# Patient Record
Sex: FEMALE | Race: BLACK OR AFRICAN AMERICAN | NOT HISPANIC OR LATINO | URBAN - METROPOLITAN AREA
[De-identification: names, ages, dates, MRNs, and addresses within clinical notes are randomized per-mention and may not be internally consistent; named-entity substitution may affect disease eponyms.]

---

## 2017-02-01 ENCOUNTER — INPATIENT (INPATIENT)
Facility: HOSPITAL | Age: 22
LOS: 2 days | Discharge: HOME | End: 2017-02-04
Attending: INTERNAL MEDICINE | Admitting: INTERNAL MEDICINE
Payer: COMMERCIAL

## 2017-02-01 VITALS
DIASTOLIC BLOOD PRESSURE: 92 MMHG | WEIGHT: 147.93 LBS | SYSTOLIC BLOOD PRESSURE: 141 MMHG | HEIGHT: 60 IN | TEMPERATURE: 98 F | RESPIRATION RATE: 20 BRPM | OXYGEN SATURATION: 100 % | HEART RATE: 113 BPM

## 2017-02-01 DIAGNOSIS — Z98.89 OTHER SPECIFIED POSTPROCEDURAL STATES: Chronic | ICD-10-CM

## 2017-02-01 LAB
ALBUMIN SERPL ELPH-MCNC: 4.1 G/DL — SIGNIFICANT CHANGE UP (ref 3.3–5)
ALP SERPL-CCNC: 89 U/L — SIGNIFICANT CHANGE UP (ref 40–120)
ALT FLD-CCNC: 115 U/L — HIGH (ref 12–78)
ANION GAP SERPL CALC-SCNC: 16 MMOL/L — SIGNIFICANT CHANGE UP (ref 5–17)
AST SERPL-CCNC: 45 U/L — HIGH (ref 15–37)
BASOPHILS # BLD AUTO: 0.1 K/UL — SIGNIFICANT CHANGE UP (ref 0–0.2)
BASOPHILS NFR BLD AUTO: 0.4 % — SIGNIFICANT CHANGE UP (ref 0–2)
BILIRUB SERPL-MCNC: 0.4 MG/DL — SIGNIFICANT CHANGE UP (ref 0.2–1.2)
BUN SERPL-MCNC: 22 MG/DL — SIGNIFICANT CHANGE UP (ref 7–23)
CALCIUM SERPL-MCNC: 9.3 MG/DL — SIGNIFICANT CHANGE UP (ref 8.5–10.1)
CHLORIDE SERPL-SCNC: 103 MMOL/L — SIGNIFICANT CHANGE UP (ref 96–108)
CO2 SERPL-SCNC: 20 MMOL/L — LOW (ref 22–31)
CREAT SERPL-MCNC: 0.6 MG/DL — SIGNIFICANT CHANGE UP (ref 0.5–1.3)
EOSINOPHIL # BLD AUTO: 0.1 K/UL — SIGNIFICANT CHANGE UP (ref 0–0.5)
EOSINOPHIL NFR BLD AUTO: 0.7 % — SIGNIFICANT CHANGE UP (ref 0–6)
GLUCOSE SERPL-MCNC: 103 MG/DL — HIGH (ref 70–99)
HCG SERPL-ACNC: <1 MIU/ML — SIGNIFICANT CHANGE UP
HCT VFR BLD CALC: 38.5 % — SIGNIFICANT CHANGE UP (ref 34.5–45)
HGB BLD-MCNC: 13.6 G/DL — SIGNIFICANT CHANGE UP (ref 11.5–15.5)
LACTATE SERPL-SCNC: 1.7 MMOL/L — SIGNIFICANT CHANGE UP (ref 0.7–2)
LIDOCAIN IGE QN: 139 U/L — SIGNIFICANT CHANGE UP (ref 73–393)
LYMPHOCYTES # BLD AUTO: 12.7 % — LOW (ref 13–44)
LYMPHOCYTES # BLD AUTO: 2.1 K/UL — SIGNIFICANT CHANGE UP (ref 1–3.3)
MCHC RBC-ENTMCNC: 29.7 PG — SIGNIFICANT CHANGE UP (ref 27–34)
MCHC RBC-ENTMCNC: 35.4 GM/DL — SIGNIFICANT CHANGE UP (ref 32–36)
MCV RBC AUTO: 83.9 FL — SIGNIFICANT CHANGE UP (ref 80–100)
MONOCYTES # BLD AUTO: 1.5 K/UL — HIGH (ref 0–0.9)
MONOCYTES NFR BLD AUTO: 8.7 % — SIGNIFICANT CHANGE UP (ref 2–14)
NEUTROPHILS # BLD AUTO: 13.2 K/UL — HIGH (ref 1.8–7.4)
NEUTROPHILS NFR BLD AUTO: 77.6 % — HIGH (ref 43–77)
PLATELET # BLD AUTO: 359 K/UL — SIGNIFICANT CHANGE UP (ref 150–400)
POTASSIUM SERPL-MCNC: 3.8 MMOL/L — SIGNIFICANT CHANGE UP (ref 3.5–5.3)
POTASSIUM SERPL-SCNC: 3.8 MMOL/L — SIGNIFICANT CHANGE UP (ref 3.5–5.3)
PROT SERPL-MCNC: 8.3 GM/DL — SIGNIFICANT CHANGE UP (ref 6–8.3)
RBC # BLD: 4.59 M/UL — SIGNIFICANT CHANGE UP (ref 3.8–5.2)
RBC # FLD: 11.2 % — SIGNIFICANT CHANGE UP (ref 11–15)
SODIUM SERPL-SCNC: 139 MMOL/L — SIGNIFICANT CHANGE UP (ref 135–145)
WBC # BLD: 17 K/UL — HIGH (ref 3.8–10.5)
WBC # FLD AUTO: 17 K/UL — HIGH (ref 3.8–10.5)

## 2017-02-01 PROCEDURE — 99053 MED SERV 10PM-8AM 24 HR FAC: CPT

## 2017-02-01 PROCEDURE — 99285 EMERGENCY DEPT VISIT HI MDM: CPT | Mod: 25

## 2017-02-01 RX ORDER — IOHEXOL 300 MG/ML
30 INJECTION, SOLUTION INTRAVENOUS ONCE
Qty: 0 | Refills: 0 | Status: COMPLETED | OUTPATIENT
Start: 2017-02-01 | End: 2017-02-01

## 2017-02-01 RX ORDER — ONDANSETRON 8 MG/1
4 TABLET, FILM COATED ORAL ONCE
Qty: 0 | Refills: 0 | Status: COMPLETED | OUTPATIENT
Start: 2017-02-01 | End: 2017-02-01

## 2017-02-01 RX ORDER — MORPHINE SULFATE 50 MG/1
6 CAPSULE, EXTENDED RELEASE ORAL ONCE
Qty: 0 | Refills: 0 | Status: DISCONTINUED | OUTPATIENT
Start: 2017-02-01 | End: 2017-02-01

## 2017-02-01 RX ORDER — SODIUM CHLORIDE 9 MG/ML
1000 INJECTION INTRAMUSCULAR; INTRAVENOUS; SUBCUTANEOUS ONCE
Qty: 0 | Refills: 0 | Status: COMPLETED | OUTPATIENT
Start: 2017-02-01 | End: 2017-02-01

## 2017-02-01 RX ADMIN — SODIUM CHLORIDE 1000 MILLILITER(S): 9 INJECTION INTRAMUSCULAR; INTRAVENOUS; SUBCUTANEOUS at 22:56

## 2017-02-01 RX ADMIN — MORPHINE SULFATE 6 MILLIGRAM(S): 50 CAPSULE, EXTENDED RELEASE ORAL at 23:02

## 2017-02-01 RX ADMIN — ONDANSETRON 4 MILLIGRAM(S): 8 TABLET, FILM COATED ORAL at 22:49

## 2017-02-01 RX ADMIN — MORPHINE SULFATE 6 MILLIGRAM(S): 50 CAPSULE, EXTENDED RELEASE ORAL at 22:49

## 2017-02-01 RX ADMIN — IOHEXOL 30 MILLILITER(S): 300 INJECTION, SOLUTION INTRAVENOUS at 23:02

## 2017-02-01 NOTE — ED PROVIDER NOTE - OBJECTIVE STATEMENT
Pertinent PMH/PSH/FHx/SHx and Review of Systems contained within:  Patient with history of C section about 4 months ago presents to the ED for severe, diffuse abdominal pain Pertinent PMH/PSH/FHx/SHx and Review of Systems contained within:  Patient with history of C section about 4 months ago presents to the ED for severe, diffuse abdominal pain.  Started about 3 hours ago when patient was taking out the trash.  Reports nausea, 1 episode of nonbilious, nonbloody vomit, no diarrhea, no melena, no vaginal discharge or bleeding.    No fever/chills, No photophobia/eye pain/changes in vision, No ear pain/sore throat/dysphagia, No chest pain/palpitations, no SOB/cough/wheeze/stridor, no dysuria/frequency/discharge, No neck/back pain, no rash, no changes in neurological status/function. Pertinent PMH/PSH/FHx/SHx and Review of Systems contained within:  Patient with history of C section about 4 months ago presents to the ED for severe, diffuse abdominal pain.  Started about 3 hours ago when patient was taking out the trash.  Reports nausea, 1 episode of nonbilious, nonbloody vomit, no diarrhea, no melena, no vaginal discharge or bleeding.  Ate frozen pizza earlier tonight.      No fever/chills, No photophobia/eye pain/changes in vision, No ear pain/sore throat/dysphagia, No chest pain/palpitations, no SOB/cough/wheeze/stridor, no dysuria/frequency/discharge, No neck/back pain, no rash, no changes in neurological status/function.

## 2017-02-01 NOTE — ED PROVIDER NOTE - MEDICAL DECISION MAKING DETAILS
Patient with abdominal pain and intractable vomiting.  Labs, CT reviewed.  Patient given multiple medications for symptom control during her overnight stay in the ER.  Continues to feel ill, not tolerating PO.  GI paged for morning consult.  Patient is to be admitted to the hospital and the case was discussed with the admitting physician.  Any changes in plan, additional imaging/labs, and further work up will be at the discretion of the admitting physician.  Patient remained stable in my care.

## 2017-02-02 DIAGNOSIS — D72.829 ELEVATED WHITE BLOOD CELL COUNT, UNSPECIFIED: ICD-10-CM

## 2017-02-02 DIAGNOSIS — R10.9 UNSPECIFIED ABDOMINAL PAIN: ICD-10-CM

## 2017-02-02 DIAGNOSIS — K29.00 ACUTE GASTRITIS WITHOUT BLEEDING: ICD-10-CM

## 2017-02-02 DIAGNOSIS — K52.9 NONINFECTIVE GASTROENTERITIS AND COLITIS, UNSPECIFIED: ICD-10-CM

## 2017-02-02 DIAGNOSIS — K75.9 INFLAMMATORY LIVER DISEASE, UNSPECIFIED: ICD-10-CM

## 2017-02-02 PROCEDURE — 76830 TRANSVAGINAL US NON-OB: CPT | Mod: 26

## 2017-02-02 PROCEDURE — 74020: CPT | Mod: 26

## 2017-02-02 PROCEDURE — 99223 1ST HOSP IP/OBS HIGH 75: CPT

## 2017-02-02 PROCEDURE — 74177 CT ABD & PELVIS W/CONTRAST: CPT | Mod: 26

## 2017-02-02 RX ORDER — HYDROMORPHONE HYDROCHLORIDE 2 MG/ML
1 INJECTION INTRAMUSCULAR; INTRAVENOUS; SUBCUTANEOUS ONCE
Qty: 0 | Refills: 0 | Status: DISCONTINUED | OUTPATIENT
Start: 2017-02-02 | End: 2017-02-02

## 2017-02-02 RX ORDER — MORPHINE SULFATE 50 MG/1
4 CAPSULE, EXTENDED RELEASE ORAL ONCE
Qty: 0 | Refills: 0 | Status: DISCONTINUED | OUTPATIENT
Start: 2017-02-02 | End: 2017-02-02

## 2017-02-02 RX ORDER — ONDANSETRON 8 MG/1
4 TABLET, FILM COATED ORAL EVERY 8 HOURS
Qty: 0 | Refills: 0 | Status: COMPLETED | OUTPATIENT
Start: 2017-02-02 | End: 2017-02-02

## 2017-02-02 RX ORDER — SODIUM CHLORIDE 9 MG/ML
1000 INJECTION, SOLUTION INTRAVENOUS
Qty: 0 | Refills: 0 | Status: DISCONTINUED | OUTPATIENT
Start: 2017-02-03 | End: 2017-02-04

## 2017-02-02 RX ORDER — ONDANSETRON 8 MG/1
4 TABLET, FILM COATED ORAL ONCE
Qty: 0 | Refills: 0 | Status: COMPLETED | OUTPATIENT
Start: 2017-02-02 | End: 2017-02-02

## 2017-02-02 RX ORDER — INFLUENZA VIRUS VACCINE 15; 15; 15; 15 UG/.5ML; UG/.5ML; UG/.5ML; UG/.5ML
0.5 SUSPENSION INTRAMUSCULAR ONCE
Qty: 0 | Refills: 0 | Status: DISCONTINUED | OUTPATIENT
Start: 2017-02-02 | End: 2017-02-04

## 2017-02-02 RX ORDER — PANTOPRAZOLE SODIUM 20 MG/1
40 TABLET, DELAYED RELEASE ORAL DAILY
Qty: 0 | Refills: 0 | Status: DISCONTINUED | OUTPATIENT
Start: 2017-02-02 | End: 2017-02-04

## 2017-02-02 RX ORDER — FAMOTIDINE 10 MG/ML
20 INJECTION INTRAVENOUS ONCE
Qty: 0 | Refills: 0 | Status: COMPLETED | OUTPATIENT
Start: 2017-02-02 | End: 2017-02-02

## 2017-02-02 RX ORDER — MORPHINE SULFATE 50 MG/1
2 CAPSULE, EXTENDED RELEASE ORAL ONCE
Qty: 0 | Refills: 0 | Status: DISCONTINUED | OUTPATIENT
Start: 2017-02-02 | End: 2017-02-02

## 2017-02-02 RX ORDER — SODIUM CHLORIDE 9 MG/ML
1000 INJECTION, SOLUTION INTRAVENOUS
Qty: 0 | Refills: 0 | Status: DISCONTINUED | OUTPATIENT
Start: 2017-02-02 | End: 2017-02-04

## 2017-02-02 RX ADMIN — FAMOTIDINE 20 MILLIGRAM(S): 10 INJECTION INTRAVENOUS at 05:04

## 2017-02-02 RX ADMIN — HYDROMORPHONE HYDROCHLORIDE 1 MILLIGRAM(S): 2 INJECTION INTRAMUSCULAR; INTRAVENOUS; SUBCUTANEOUS at 00:35

## 2017-02-02 RX ADMIN — MORPHINE SULFATE 2 MILLIGRAM(S): 50 CAPSULE, EXTENDED RELEASE ORAL at 19:41

## 2017-02-02 RX ADMIN — MORPHINE SULFATE 4 MILLIGRAM(S): 50 CAPSULE, EXTENDED RELEASE ORAL at 03:58

## 2017-02-02 RX ADMIN — MORPHINE SULFATE 4 MILLIGRAM(S): 50 CAPSULE, EXTENDED RELEASE ORAL at 09:29

## 2017-02-02 RX ADMIN — MORPHINE SULFATE 4 MILLIGRAM(S): 50 CAPSULE, EXTENDED RELEASE ORAL at 04:36

## 2017-02-02 RX ADMIN — MORPHINE SULFATE 2 MILLIGRAM(S): 50 CAPSULE, EXTENDED RELEASE ORAL at 19:16

## 2017-02-02 RX ADMIN — ONDANSETRON 4 MILLIGRAM(S): 8 TABLET, FILM COATED ORAL at 05:04

## 2017-02-02 RX ADMIN — Medication 30 MILLILITER(S): at 13:29

## 2017-02-02 RX ADMIN — Medication 30 MILLILITER(S): at 05:04

## 2017-02-02 RX ADMIN — SODIUM CHLORIDE 150 MILLILITER(S): 9 INJECTION, SOLUTION INTRAVENOUS at 13:03

## 2017-02-02 RX ADMIN — SODIUM CHLORIDE 150 MILLILITER(S): 9 INJECTION, SOLUTION INTRAVENOUS at 21:27

## 2017-02-02 RX ADMIN — HYDROMORPHONE HYDROCHLORIDE 1 MILLIGRAM(S): 2 INJECTION INTRAMUSCULAR; INTRAVENOUS; SUBCUTANEOUS at 02:40

## 2017-02-02 RX ADMIN — PANTOPRAZOLE SODIUM 40 MILLIGRAM(S): 20 TABLET, DELAYED RELEASE ORAL at 13:03

## 2017-02-02 RX ADMIN — ONDANSETRON 4 MILLIGRAM(S): 8 TABLET, FILM COATED ORAL at 13:57

## 2017-02-02 RX ADMIN — Medication 30 MILLILITER(S): at 21:27

## 2017-02-02 NOTE — DISCHARGE NOTE ADULT - CARE PROVIDER_API CALL
Bebeto Goodrich), Gastroenterology; Internal Medicine  [address] [place] [address]  Phone: [phone]  Fax: [phone]

## 2017-02-02 NOTE — DISCHARGE NOTE ADULT - PATIENT PORTAL LINK FT
“You can access the [facility] Patient Portal, offered by [facility], by registering with the following website: [email or web address]”

## 2017-02-02 NOTE — H&P ADULT. - NEUROLOGICAL DETAILS
normal strength/sensation intact/superficial reflexes intact/alert and oriented x 3/responds to pain/responds to verbal commands/deep reflexes intact/cranial nerves intact

## 2017-02-02 NOTE — ED ADULT NURSE REASSESSMENT NOTE - NS ED NURSE REASSESS COMMENT FT1
Patient continues to complain of abdominal pain despite pain medications (morphine and dilaudid) and is vomiting.  Informed Dr. Adame.  Administered maalox, pepcid and zofran IVP.  Will continue to monitor.

## 2017-02-02 NOTE — H&P ADULT. - NEGATIVE NEUROLOGICAL SYMPTOMS
no transient paralysis/no weakness/no confusion/no hemiparesis/no generalized seizures/no focal seizures/no loss of consciousness/no paresthesias/no syncope/no difficulty walking/no tremors/no vertigo/no loss of sensation/no headache/no facial palsy

## 2017-02-02 NOTE — H&P ADULT. - NEGATIVE OPHTHALMOLOGIC SYMPTOMS
no diplopia/no photophobia/no blurred vision L/no lacrimation L/no lacrimation R/no blurred vision R

## 2017-02-02 NOTE — H&P ADULT. - NEGATIVE CARDIOVASCULAR SYMPTOMS
no palpitations/no paroxysmal nocturnal dyspnea/no chest pain/no dyspnea on exertion/no orthopnea/no peripheral edema

## 2017-02-02 NOTE — ED ADULT NURSE REASSESSMENT NOTE - NS ED NURSE REASSESS COMMENT FT1
Patient continues to complain of abdominal pain.  Patient is cringing and moaning.  Patient was administered morphine 6 mg IVP, dilaudid 1 mg IVP, and another morphine 4 mg IVP.  Informed Dr. Adame.

## 2017-02-02 NOTE — H&P ADULT. - PROBLEM SELECTOR PLAN 1
-admit to med-sx  -appreciate GI eval  -cont on peptobismal and PPI  -add zofran  - EGD if no improvement.

## 2017-02-02 NOTE — DISCHARGE NOTE ADULT - CARE PLAN
Principal Discharge DX:	Acute gastritis without hemorrhage, unspecified gastritis type  Instructions for follow-up, activity and diet:	encourage po fluids  Secondary Diagnosis:	Acute abdominal pain  Secondary Diagnosis:	Leukocytosis, unspecified type  Secondary Diagnosis:	Hepatitis Principal Discharge DX:	Gastroenteritis  Goal:	improved; followup with Gastroenterology, Dr Goodrich within 3-5 days of discharge  Instructions for follow-up, activity and diet:	encourage po fluids  Secondary Diagnosis:	Acute abdominal pain  Secondary Diagnosis:	Leukocytosis, unspecified type  Secondary Diagnosis:	Hepatitis  Secondary Diagnosis:	Volume depletion

## 2017-02-02 NOTE — H&P ADULT. - HISTORY OF PRESENT ILLNESS
The patient is a 21 year old female with no significant PMH who presents to ED with nausea, vomiting and abdominal pain since 7 PM Wednesday. States the pain is diffuse, nonradiating, was constant now intermittent, in intensity, stabbing and cramping at times worse with food. No diarrhea, or fever. Symptoms consistent with same symptoms when patient had her gallbladder removed 10/20/15. Her grand mother who is visiting currently has similar symptoms but including diarrhea. Patient states she did not have an EGD at the present time. She vomited initially undigested food and then gagging after. NShe received pain med in ED. Now feeling better. Claimed she ate frozen pizza and chicken w/ rice yesterday.

## 2017-02-02 NOTE — H&P ADULT. - ASSESSMENT
The patient is a 21 year old female with no significant PMH who presents to ED with nausea, vomiting and abdominal pain.   CT abd-   Nonspecific fluid distention of the small and large bowel   which may be associated with gastroenteritis in the correct clinical   setting.  No bowel obstruction or evident bowel wall thickening.

## 2017-02-02 NOTE — DISCHARGE NOTE ADULT - PLAN OF CARE
encourage po fluids improved; followup with Gastroenterology, Dr Goodrich within 3-5 days of discharge

## 2017-02-02 NOTE — CONSULT NOTE ADULT - SUBJECTIVE AND OBJECTIVE BOX
HPI: The patient is a 21 year old female whoe presents with n/v and abdominal pain since 7 PM Wednesday. Continues. Crampy. No diarrhea, or fever. Symptoms consistent with same symptoms when patient had her gallbladder removed 10/20/15. Patient states she did not have an EGD at the present time ) No felling better. Ate frozen pizza and chicken w/ rice yesterday. No other friends/family with same symptoms.      PAST MEDICAL & SURGICAL HISTORY:  Menorrhagia with irregular cycle  H/O bilateral breast reduction surgery (2015)  C/S 3 months ago  Cholecystectomy 10/2/15      MEDICATIONS  (STANDING): - None from home.  influenza   Vaccine 0.5milliLiter(s) IntraMuscular once    MEDICATIONS  (PRN):      Allergies    apple (Unknown)  beer (Unknown)  cat fish (Unknown)  cherry (Unknown)  kiwi (Unknown)  lima beans (Unknown)  No Known Drug Allergies  raspberries (Unknown)  rice (Unknown)    Intolerances        FAMILY HISTORY:  No pertinent family history in first degree relatives      REVIEW OF SYSTEMS:    CONSTITUTIONAL: No fever, weight loss, or fatigue  EYES: No eye pain, visual disturbances, or discharge  ENMT:  No difficulty hearing, tinnitus, vertigo; No sinus or throat pain  NECK: No pain or stiffness  BREASTS: No pain, masses, or nipple discharge  RESPIRATORY: No cough, wheezing, chills or hemoptysis; No shortness of breath  CARDIOVASCULAR: No chest pain, palpitations, dizziness, or leg swelling  GASTROINTESTINAL: No abdominal or epigastric pain. No nausea, vomiting, or hematemesis; No diarrhea or constipation. No melena or hematochezia.  GENITOURINARY: No dysuria, frequency, hematuria, or incontinence  NEUROLOGICAL: No headaches, memory loss, loss of strength, numbness, or tremors  SKIN: No itching, burning, rashes, or lesions   LYMPH NODES: No enlarged glands  ENDOCRINE: No heat or cold intolerance; No hair loss          SOCIAL HISTORY:  CIG - Occasionally  ETOH - occasionally    FAMILY HISTORY:  No pertinent family history in first degree relatives      Vital Signs Last 24 Hrs  T(C): 36.6, Max: 37.2 (02-02 @ 02:40)  T(F): 97.9, Max: 99 (02-02 @ 02:40)  HR: 94 (94 - 116)  BP: 105/55 (100/76 - 141/92)  BP(mean): --  RR: 12 (12 - 20)  SpO2: 99% (99% - 100%)    PHYSICAL EXAM:    GENERAL: NAD, well-groomed, well-developed  HEAD:  Atraumatic, Normocephalic  EYES: EOMI, PERRLA, conjunctiva and sclera clear  ENMT: No tonsillar erythema, exudates, or enlargement; Moist mucous membranes, Good dentition, No lesions  NECK: Supple, No JVD, Normal thyroid  NERVOUS SYSTEM:  Alert & Oriented X3, Good concentration; Motor Strength 5/5 B/L upper and lower extremities; DTRs 2+ intact and symmetric  CHEST/LUNG: Clear to percussion bilaterally; No rales, rhonchi, wheezing, or rubs  HEART: Regular rate and rhythm; No murmurs, rubs, or gallops  ABDOMEN: Soft, Nontender, Nondistended; Bowel sounds present  EXTREMITIES:  2+ Peripheral Pulses, No clubbing, cyanosis, or edema  LYMPH: No lymphadenopathy noted   RECTAL: No masses, prostate normal size and smooth, Guaiaci negative   BREAST: No palpable masses, skin no lesions no retractions, no discharges. adnexal no palpable masses noted   GYN: uterus normal size, adnexal, no palpable masses, no CMT, no uterine discharge   SKIN: No rashes or lesions    LABS:                        13.6   17.0  )-----------( 359      ( 01 Feb 2017 23:16 )             38.5     01 Feb 2017 23:16    139    |  103    |  22     ----------------------------<  103    3.8     |  20     |  0.60     Ca    9.3        01 Feb 2017 23:16    TPro  8.3    /  Alb  4.1    /  TBili  0.4    /  DBili  x      /  AST  45     /  ALT  115    /  AlkPhos  89     01 Feb 2017 23:16            RADIOLOGY & ADDITIONAL STUDIES:

## 2017-02-02 NOTE — DISCHARGE NOTE ADULT - HOSPITAL COURSE
The patient is a 21 year old female with no significant PMH who presents to ED with nausea, vomiting and abdominal pain since 7 PM Wednesday. Her grand mother who is visiting currently has similar symptoms but including diarrhea. 21F, no significant PMH, hx cholecystectomy, adm w/n/v, abd pain, slightly elev LFTs, neg Xray abd, neg transvaginal u/s, CTAP neg, neg hepatitis panel; s/p EGD w/neg findings; tx'd for gastroenteritis and volume depletion supportively w/IVFs, bowel rest, antiemetics, PPI, prn analgesia; diet advanced and pt tolerated without difficulty.  Abnormal LFTs will be followed up by GI as outpatient.

## 2017-02-02 NOTE — H&P ADULT. - RS GEN PE MLT RESP DETAILS PC
respirations non-labored/breath sounds equal/clear to auscultation bilaterally/airway patent/good air movement

## 2017-02-03 DIAGNOSIS — R11.2 NAUSEA WITH VOMITING, UNSPECIFIED: ICD-10-CM

## 2017-02-03 DIAGNOSIS — E87.6 HYPOKALEMIA: ICD-10-CM

## 2017-02-03 DIAGNOSIS — E86.9 VOLUME DEPLETION, UNSPECIFIED: ICD-10-CM

## 2017-02-03 LAB
ALBUMIN SERPL ELPH-MCNC: 3 G/DL — LOW (ref 3.3–5)
ALP SERPL-CCNC: 149 U/L — HIGH (ref 40–120)
ALT FLD-CCNC: 364 U/L — HIGH (ref 12–78)
ANION GAP SERPL CALC-SCNC: 11 MMOL/L — SIGNIFICANT CHANGE UP (ref 5–17)
AST SERPL-CCNC: 174 U/L — HIGH (ref 15–37)
BILIRUB DIRECT SERPL-MCNC: 0.13 MG/DL — SIGNIFICANT CHANGE UP (ref 0.05–0.2)
BILIRUB INDIRECT FLD-MCNC: 0.3 MG/DL — SIGNIFICANT CHANGE UP (ref 0.2–1)
BILIRUB SERPL-MCNC: 0.4 MG/DL — SIGNIFICANT CHANGE UP (ref 0.2–1.2)
BUN SERPL-MCNC: 9 MG/DL — SIGNIFICANT CHANGE UP (ref 7–23)
CALCIUM SERPL-MCNC: 8.3 MG/DL — LOW (ref 8.5–10.1)
CHLORIDE SERPL-SCNC: 104 MMOL/L — SIGNIFICANT CHANGE UP (ref 96–108)
CO2 SERPL-SCNC: 26 MMOL/L — SIGNIFICANT CHANGE UP (ref 22–31)
CREAT SERPL-MCNC: 0.57 MG/DL — SIGNIFICANT CHANGE UP (ref 0.5–1.3)
CRP SERPL-MCNC: 4.66 MG/DL — HIGH (ref 0–0.4)
GLUCOSE SERPL-MCNC: 87 MG/DL — SIGNIFICANT CHANGE UP (ref 70–99)
HAV IGM SER-ACNC: SIGNIFICANT CHANGE UP
HBV CORE IGM SER-ACNC: SIGNIFICANT CHANGE UP
HBV SURFACE AG SER-ACNC: SIGNIFICANT CHANGE UP
HCT VFR BLD CALC: 32.9 % — LOW (ref 34.5–45)
HCV AB S/CO SERPL IA: 0.2 S/CO — SIGNIFICANT CHANGE UP
HCV AB SERPL-IMP: SIGNIFICANT CHANGE UP
HGB BLD-MCNC: 11.2 G/DL — LOW (ref 11.5–15.5)
MCHC RBC-ENTMCNC: 29.5 PG — SIGNIFICANT CHANGE UP (ref 27–34)
MCHC RBC-ENTMCNC: 34.1 GM/DL — SIGNIFICANT CHANGE UP (ref 32–36)
MCV RBC AUTO: 86.5 FL — SIGNIFICANT CHANGE UP (ref 80–100)
PLATELET # BLD AUTO: 253 K/UL — SIGNIFICANT CHANGE UP (ref 150–400)
POTASSIUM SERPL-MCNC: 3.3 MMOL/L — LOW (ref 3.5–5.3)
POTASSIUM SERPL-SCNC: 3.3 MMOL/L — LOW (ref 3.5–5.3)
PROT SERPL-MCNC: 6.7 GM/DL — SIGNIFICANT CHANGE UP (ref 6–8.3)
RBC # BLD: 3.8 M/UL — SIGNIFICANT CHANGE UP (ref 3.8–5.2)
RBC # FLD: 11.8 % — SIGNIFICANT CHANGE UP (ref 11–15)
SODIUM SERPL-SCNC: 141 MMOL/L — SIGNIFICANT CHANGE UP (ref 135–145)
WBC # BLD: 4.7 K/UL — SIGNIFICANT CHANGE UP (ref 3.8–10.5)
WBC # FLD AUTO: 4.7 K/UL — SIGNIFICANT CHANGE UP (ref 3.8–10.5)

## 2017-02-03 PROCEDURE — 88305 TISSUE EXAM BY PATHOLOGIST: CPT | Mod: 26

## 2017-02-03 PROCEDURE — 88312 SPECIAL STAINS GROUP 1: CPT | Mod: 26

## 2017-02-03 PROCEDURE — 99233 SBSQ HOSP IP/OBS HIGH 50: CPT

## 2017-02-03 RX ORDER — ONDANSETRON 8 MG/1
4 TABLET, FILM COATED ORAL ONCE
Qty: 0 | Refills: 0 | Status: DISCONTINUED | OUTPATIENT
Start: 2017-02-03 | End: 2017-02-03

## 2017-02-03 RX ORDER — SODIUM CHLORIDE 9 MG/ML
1000 INJECTION INTRAMUSCULAR; INTRAVENOUS; SUBCUTANEOUS
Qty: 0 | Refills: 0 | Status: DISCONTINUED | OUTPATIENT
Start: 2017-02-03 | End: 2017-02-03

## 2017-02-03 RX ORDER — POTASSIUM CHLORIDE 20 MEQ
40 PACKET (EA) ORAL EVERY 4 HOURS
Qty: 0 | Refills: 0 | Status: COMPLETED | OUTPATIENT
Start: 2017-02-03 | End: 2017-02-03

## 2017-02-03 RX ADMIN — SODIUM CHLORIDE 63 MILLILITER(S): 9 INJECTION, SOLUTION INTRAVENOUS at 01:01

## 2017-02-03 RX ADMIN — SODIUM CHLORIDE 75 MILLILITER(S): 9 INJECTION INTRAMUSCULAR; INTRAVENOUS; SUBCUTANEOUS at 08:22

## 2017-02-03 RX ADMIN — Medication 40 MILLIEQUIVALENT(S): at 14:23

## 2017-02-03 RX ADMIN — Medication 40 MILLIEQUIVALENT(S): at 17:16

## 2017-02-03 RX ADMIN — PANTOPRAZOLE SODIUM 40 MILLIGRAM(S): 20 TABLET, DELAYED RELEASE ORAL at 11:24

## 2017-02-03 NOTE — PROGRESS NOTE ADULT - SUBJECTIVE AND OBJECTIVE BOX
Upper esophagogastroduodenoscopy/ENDOSCOPY    -Informed consent obtained from patient prior to exam.     Indication:  N/V, Abdominal pain    Anesthesia:   provided by:  Dr Beckham    Esophogus:  WNL    Stomach:  Small Hiatal Hernia,  Approximately 150 ml bilious liquids, mild antral gastritis s/p biopsy    Duodenum:  WNL    The patient tolerated the procedure well.    Findings:  As above    Plan:  See below

## 2017-02-03 NOTE — BRIEF OPERATIVE NOTE - POST-OP DX
Gastritis without bleeding, unspecified chronicity, unspecified gastritis type  02/03/2017    Active  Bebeto Goodrich  Hiatal hernia  02/03/2017    Active  Bebeto Goodrich

## 2017-02-03 NOTE — BRIEF OPERATIVE NOTE - PRE-OP DX
Generalized abdominal pain  02/03/2017    Active  Bebeto Goodrich  Non-intractable vomiting with nausea, unspecified vomiting type  02/03/2017    Active  Bebeto Goodrich

## 2017-02-03 NOTE — PROGRESS NOTE ADULT - ASSESSMENT
Nausea/vomiting, abdominal pain - significantly better today. Probably gastroenteritis, EGD essentially negative

## 2017-02-04 VITALS
OXYGEN SATURATION: 98 % | DIASTOLIC BLOOD PRESSURE: 56 MMHG | HEART RATE: 69 BPM | RESPIRATION RATE: 16 BRPM | TEMPERATURE: 99 F | SYSTOLIC BLOOD PRESSURE: 127 MMHG

## 2017-02-04 LAB
ALBUMIN SERPL ELPH-MCNC: 3 G/DL — LOW (ref 3.3–5)
ALP SERPL-CCNC: 120 U/L — SIGNIFICANT CHANGE UP (ref 40–120)
ALT FLD-CCNC: 234 U/L — HIGH (ref 12–78)
ANION GAP SERPL CALC-SCNC: 12 MMOL/L — SIGNIFICANT CHANGE UP (ref 5–17)
ANION GAP SERPL CALC-SCNC: 8 MMOL/L — SIGNIFICANT CHANGE UP (ref 5–17)
AST SERPL-CCNC: 62 U/L — HIGH (ref 15–37)
BILIRUB SERPL-MCNC: 0.3 MG/DL — SIGNIFICANT CHANGE UP (ref 0.2–1.2)
BUN SERPL-MCNC: 6 MG/DL — LOW (ref 7–23)
BUN SERPL-MCNC: 7 MG/DL — SIGNIFICANT CHANGE UP (ref 7–23)
CALCIUM SERPL-MCNC: 7.6 MG/DL — LOW (ref 8.5–10.1)
CALCIUM SERPL-MCNC: 8.4 MG/DL — LOW (ref 8.5–10.1)
CHLORIDE SERPL-SCNC: 104 MMOL/L — SIGNIFICANT CHANGE UP (ref 96–108)
CHLORIDE SERPL-SCNC: 108 MMOL/L — SIGNIFICANT CHANGE UP (ref 96–108)
CO2 SERPL-SCNC: 23 MMOL/L — SIGNIFICANT CHANGE UP (ref 22–31)
CO2 SERPL-SCNC: 24 MMOL/L — SIGNIFICANT CHANGE UP (ref 22–31)
CREAT SERPL-MCNC: 0.53 MG/DL — SIGNIFICANT CHANGE UP (ref 0.5–1.3)
CREAT SERPL-MCNC: 0.61 MG/DL — SIGNIFICANT CHANGE UP (ref 0.5–1.3)
GLUCOSE SERPL-MCNC: 100 MG/DL — HIGH (ref 70–99)
GLUCOSE SERPL-MCNC: 516 MG/DL — CRITICAL HIGH (ref 70–99)
POTASSIUM SERPL-MCNC: 3.9 MMOL/L — SIGNIFICANT CHANGE UP (ref 3.5–5.3)
POTASSIUM SERPL-MCNC: 4 MMOL/L — SIGNIFICANT CHANGE UP (ref 3.5–5.3)
POTASSIUM SERPL-SCNC: 3.9 MMOL/L — SIGNIFICANT CHANGE UP (ref 3.5–5.3)
POTASSIUM SERPL-SCNC: 4 MMOL/L — SIGNIFICANT CHANGE UP (ref 3.5–5.3)
PROT SERPL-MCNC: 6.5 GM/DL — SIGNIFICANT CHANGE UP (ref 6–8.3)
SODIUM SERPL-SCNC: 135 MMOL/L — SIGNIFICANT CHANGE UP (ref 135–145)
SODIUM SERPL-SCNC: 144 MMOL/L — SIGNIFICANT CHANGE UP (ref 135–145)

## 2017-02-04 PROCEDURE — 99239 HOSP IP/OBS DSCHRG MGMT >30: CPT

## 2017-02-04 RX ORDER — MORPHINE SULFATE 50 MG/1
2 CAPSULE, EXTENDED RELEASE ORAL ONCE
Qty: 0 | Refills: 0 | Status: DISCONTINUED | OUTPATIENT
Start: 2017-02-04 | End: 2017-02-04

## 2017-02-04 RX ADMIN — SODIUM CHLORIDE 63 MILLILITER(S): 9 INJECTION, SOLUTION INTRAVENOUS at 06:13

## 2017-02-04 RX ADMIN — PANTOPRAZOLE SODIUM 40 MILLIGRAM(S): 20 TABLET, DELAYED RELEASE ORAL at 12:21

## 2017-02-04 RX ADMIN — MORPHINE SULFATE 2 MILLIGRAM(S): 50 CAPSULE, EXTENDED RELEASE ORAL at 02:17

## 2017-02-04 RX ADMIN — MORPHINE SULFATE 2 MILLIGRAM(S): 50 CAPSULE, EXTENDED RELEASE ORAL at 02:00

## 2017-02-04 NOTE — PROGRESS NOTE ADULT - SUBJECTIVE AND OBJECTIVE BOX
Patient is a 21y old  Female who presents with a chief complaint of nausea, vomiting, abdominal pain (02 Feb 2017 14:42)      HPI:  The patient is a 21 year old female with no significant PMH who presents to ED with nausea, vomiting and abdominal pain since 7 PM Wednesday. States the pain is diffuse, nonradiating, was constant now intermittent, in intensity, stabbing and cramping at times worse with food. No diarrhea, or fever. Symptoms consistent with same symptoms when patient had her gallbladder removed 10/20/15. Her grand mother who is visiting currently has similar symptoms but including diarrhea. Patient states she did not have an EGD at the present time. She vomited initially undigested food and then gagging after. NShe received pain med in ED. Now feeling better. Claimed she ate frozen pizza and chicken w/ rice yesterday. (02 Feb 2017 12:59)      INTERVAL HPI/OVERNIGHT EVENTS:  Patient doing well. Tolerated regular diet.    MEDICATIONS  (STANDING):  influenza   Vaccine 0.5milliLiter(s) IntraMuscular once  dextrose 5% + sodium chloride 0.45%. 1000milliLiter(s) IV Continuous <Continuous>  dextrose 5% + sodium chloride 0.45%. 1000milliLiter(s) IV Continuous <Continuous>  pantoprazole  Injectable 40milliGRAM(s) IV Push daily    MEDICATIONS  (PRN):      FAMILY HISTORY:  No pertinent family history in first degree relatives      Allergies    apple (Unknown)  beer (Unknown)  cat fish (Unknown)  cherry (Unknown)  kiwi (Unknown)  lima beans (Unknown)  No Known Drug Allergies  raspberries (Unknown)  rice (Unknown)    Intolerances        PMH/PSH:  Menorrhagia with irregular cycle  H/O bilateral breast reduction surgery        REVIEW OF SYSTEMS:  CONSTITUTIONAL: No fever, weight loss, or fatigue  RESPIRATORY: No cough, wheezing, chills or hemoptysis; No shortness of breath  CARDIOVASCULAR: No chest pain, palpitations, dizziness, or leg swelling  GASTROINTESTINAL: No abdominal or epigastric pain. No nausea, vomiting, or hematemesis; No diarrhea or constipation. No melena or hematochezia.  GENITOURINARY: No dysuria, frequency, or hematuria      Vital Signs Last 24 Hrs  T(C): 37.1, Max: 37.2 (02-03 @ 17:28)  T(F): 98.8, Max: 99 (02-03 @ 17:28)  HR: 77 (58 - 77)  BP: 93/50 (93/45 - 132/63)  BP(mean): --  RR: 16 (16 - 18)  SpO2: 100% (95% - 100%)    PHYSICAL EXAM:  GENERAL: NAD, well-groomed, well-developed  HEAD:  Atraumatic, Normocephalic  EYES: EOMI, PERRLA, conjunctiva and sclera clear  NECK: Supple, No JVD, Normal thyroid  NERVOUS SYSTEM:  Alert & Oriented X3, Good concentration; Motor Strength 5/5 B/L upper and lower extremities;   CHEST/LUNG: Clear to percussion bilaterally; No rales, rhonchi, wheezing, or rubs  HEART: Regular rate and rhythm; No murmurs, rubs, or gallops  ABDOMEN: Soft, Nontender, Nondistended; Bowel sounds present  EXTREMITIES:  2+ Peripheral Pulses, No clubbing, cyanosis, or edema  LYMPH: No lymphadenopathy noted    LABS:  amylase--[id number] 02-01 @ 23:16    02-03 @ 07:58   HGB 32.9  HCT 11.2  MCV 86.5   RDW 11.8 WBC 4.7 PLTA 253        02-01 @ 23:16   HGB 38.5  HCT 13.6  MCV 83.9   RDW 11.2 WBC 17.0 PLTA 359          04 Feb 2017 08:46    144    |  108    |  7      ----------------------------<  100    3.9     |  24     |  0.53   03 Feb 2017 23:02    135    |  104    |  6      ----------------------------<  516    4.0     |  23     |  0.61   03 Feb 2017 07:58    141    |  104    |  9      ----------------------------<  87     3.3     |  26     |  0.57   01 Feb 2017 23:16    139    |  103    |  22     ----------------------------<  103    3.8     |  20     |  0.60     Ca    8.4        04 Feb 2017 08:46  Ca    7.6        03 Feb 2017 23:02  Ca    8.3        03 Feb 2017 07:58  Ca    9.3        01 Feb 2017 23:16    TPro  6.5    /  Alb  3.0    /  TBili  0.3    /  DBili  x      /  AST  62     /  ALT  234    /  AlkPhos  120    04 Feb 2017 08:46  TPro  6.7    /  Alb  3.0    /  TBili  0.4    /  DBili  .13    /  AST  174    /  ALT  364    /  AlkPhos  149    03 Feb 2017 07:58  TPro  8.3    /  Alb  4.1    /  TBili  0.4    /  DBili  x      /  AST  45     /  ALT  115    /  AlkPhos  89     01 Feb 2017 23:16        CAPILLARY BLOOD GLUCOSE  115 (04 Feb 2017 00:13)    C-Reactive Protein, Serum: 4.66 mg/dL (02-03 @ 09:48)      RADIOLOGY & ADDITIONAL TESTS:    Imaging Personally Reviewed:  [ ] YES  [ ] NO    Consultant(s) Notes Reviewed:  [ ] YES  [ ] NO    Care Discussed with Consultants/Other Providers [ ] YES  [ ] NO

## 2017-02-04 NOTE — PROGRESS NOTE ADULT - ASSESSMENT
Nausea/vomiting, abdominal pain - Patient asymptomatic, tolerating regular diet.  Probably gastroenteritis, EGD essentially negative  Elevated LFTs-better today. CBD nml by CT scan

## 2017-02-04 NOTE — PROVIDER CONTACT NOTE (CRITICAL VALUE NOTIFICATION) - ACTION/TREATMENT ORDERED:
LOLY Bojorquez aware of serum glucose and fingerstick. No orders received. will monitor. repeat blood work ordered for am.

## 2017-02-04 NOTE — PROGRESS NOTE ADULT - PROBLEM SELECTOR PLAN 2
LFTS significantly increased 2317 but improved 2417. r/o viral hepatitis ( EBV, CMV). Patient can be d/c'd and followed as out patient. name / number given to patient.
f/u labs pending

## 2017-02-05 LAB
CMV IGG FLD QL: 0.45 U/ML — SIGNIFICANT CHANGE UP
CMV IGG SERPL-IMP: NEGATIVE — SIGNIFICANT CHANGE UP
CMV IGM FLD-ACNC: <8 AU/ML — SIGNIFICANT CHANGE UP
CMV IGM SERPL QL: NEGATIVE — SIGNIFICANT CHANGE UP

## 2017-02-06 LAB
EBV DNA SERPL NAA+PROBE-ACNC: SIGNIFICANT CHANGE UP
EBVPCR LOG: SIGNIFICANT CHANGE UP LOGIU/ML
SURGICAL PATHOLOGY FINAL REPORT - CH: SIGNIFICANT CHANGE UP

## 2017-02-07 DIAGNOSIS — E87.6 HYPOKALEMIA: ICD-10-CM

## 2017-02-07 DIAGNOSIS — Z91.013 ALLERGY TO SEAFOOD: ICD-10-CM

## 2017-02-07 DIAGNOSIS — Z90.49 ACQUIRED ABSENCE OF OTHER SPECIFIED PARTS OF DIGESTIVE TRACT: ICD-10-CM

## 2017-02-07 DIAGNOSIS — Z91.018 ALLERGY TO OTHER FOODS: ICD-10-CM

## 2017-02-07 DIAGNOSIS — K52.9 NONINFECTIVE GASTROENTERITIS AND COLITIS, UNSPECIFIED: ICD-10-CM

## 2017-02-07 DIAGNOSIS — R94.5 ABNORMAL RESULTS OF LIVER FUNCTION STUDIES: ICD-10-CM

## 2017-02-07 DIAGNOSIS — R11.2 NAUSEA WITH VOMITING, UNSPECIFIED: ICD-10-CM

## 2017-02-07 DIAGNOSIS — K44.9 DIAPHRAGMATIC HERNIA WITHOUT OBSTRUCTION OR GANGRENE: ICD-10-CM

## 2017-02-07 DIAGNOSIS — K75.9 INFLAMMATORY LIVER DISEASE, UNSPECIFIED: ICD-10-CM

## 2017-02-07 DIAGNOSIS — R73.9 HYPERGLYCEMIA, UNSPECIFIED: ICD-10-CM

## 2017-02-07 DIAGNOSIS — E86.9 VOLUME DEPLETION, UNSPECIFIED: ICD-10-CM

## 2017-02-07 DIAGNOSIS — K29.00 ACUTE GASTRITIS WITHOUT BLEEDING: ICD-10-CM

## 2017-02-07 DIAGNOSIS — D72.829 ELEVATED WHITE BLOOD CELL COUNT, UNSPECIFIED: ICD-10-CM

## 2017-09-23 ENCOUNTER — EMERGENCY (EMERGENCY)
Facility: HOSPITAL | Age: 22
LOS: 0 days | Discharge: HOME | End: 2017-09-23
Attending: EMERGENCY MEDICINE
Payer: COMMERCIAL

## 2017-09-23 VITALS
DIASTOLIC BLOOD PRESSURE: 80 MMHG | WEIGHT: 143.96 LBS | HEART RATE: 93 BPM | TEMPERATURE: 98 F | HEIGHT: 60 IN | RESPIRATION RATE: 18 BRPM | SYSTOLIC BLOOD PRESSURE: 125 MMHG | OXYGEN SATURATION: 99 %

## 2017-09-23 VITALS
DIASTOLIC BLOOD PRESSURE: 77 MMHG | SYSTOLIC BLOOD PRESSURE: 128 MMHG | TEMPERATURE: 99 F | RESPIRATION RATE: 17 BRPM | OXYGEN SATURATION: 99 % | HEART RATE: 88 BPM

## 2017-09-23 DIAGNOSIS — Z98.89 OTHER SPECIFIED POSTPROCEDURAL STATES: Chronic | ICD-10-CM

## 2017-09-23 PROCEDURE — 99284 EMERGENCY DEPT VISIT MOD MDM: CPT | Mod: 25

## 2017-09-23 RX ORDER — DEXAMETHASONE 0.5 MG/5ML
10 ELIXIR ORAL ONCE
Qty: 0 | Refills: 0 | Status: COMPLETED | OUTPATIENT
Start: 2017-09-23 | End: 2017-09-23

## 2017-09-23 RX ORDER — ALBUTEROL 90 UG/1
1 AEROSOL, METERED ORAL
Qty: 12 | Refills: 0 | OUTPATIENT
Start: 2017-09-23 | End: 2017-09-25

## 2017-09-23 RX ORDER — IPRATROPIUM/ALBUTEROL SULFATE 18-103MCG
3 AEROSOL WITH ADAPTER (GRAM) INHALATION
Qty: 0 | Refills: 0 | Status: COMPLETED | OUTPATIENT
Start: 2017-09-23 | End: 2017-09-23

## 2017-09-23 RX ORDER — AZITHROMYCIN 500 MG/1
1 TABLET, FILM COATED ORAL
Qty: 4 | Refills: 0 | OUTPATIENT
Start: 2017-09-23 | End: 2017-09-27

## 2017-09-23 RX ORDER — KETOROLAC TROMETHAMINE 30 MG/ML
60 SYRINGE (ML) INJECTION ONCE
Qty: 0 | Refills: 0 | Status: DISCONTINUED | OUTPATIENT
Start: 2017-09-23 | End: 2017-09-23

## 2017-09-23 RX ORDER — AZITHROMYCIN 500 MG/1
500 TABLET, FILM COATED ORAL ONCE
Qty: 0 | Refills: 0 | Status: COMPLETED | OUTPATIENT
Start: 2017-09-23 | End: 2017-09-23

## 2017-09-23 RX ADMIN — Medication 60 MILLIGRAM(S): at 09:38

## 2017-09-23 RX ADMIN — Medication 60 MILLIGRAM(S): at 08:37

## 2017-09-23 RX ADMIN — Medication 10 MILLIGRAM(S): at 08:36

## 2017-09-23 RX ADMIN — Medication 3 MILLILITER(S): at 08:25

## 2017-09-23 RX ADMIN — Medication 3 MILLILITER(S): at 08:30

## 2017-09-23 RX ADMIN — AZITHROMYCIN 500 MILLIGRAM(S): 500 TABLET, FILM COATED ORAL at 08:36

## 2017-09-23 RX ADMIN — Medication 3 MILLILITER(S): at 08:35

## 2017-09-23 NOTE — ED ADULT NURSE NOTE - DISCHARGE DATE/TIME
HISTORY OF PRESENT ILLNESS     HPI Comments: 53-year-old gentleman who presents to the walk-in clinic today complaining of sore throat, pain with swallowing, nasal congestion, postnasal drip.  Symptoms have only been present for last 24 hours.  He did not take anything over-the-counter.  No nausea or vomiting.  No documented fevers.      PAST MEDICAL, FAMILY AND SOCIAL HISTORY     The following histories were personally reviewed and updated.  Current medications, Allergies, Problem list, Past Medical History and Social History    REVIEW OF SYSTEMS     Review of Systems   HENT: Positive for congestion, postnasal drip, rhinorrhea, sinus pressure and sore throat.        PHYSICAL EXAM     Physical Exam   Constitutional: He is oriented to person, place, and time. He appears well-developed and well-nourished. No distress.   HENT:   Head: Normocephalic and atraumatic.   Thick mucus secretions seen behind the tympanic membranes bilaterally.  Palpation of the frontal and maxillary sinuses did not elicit tenderness.  Oropharynx is clear.   Eyes: Conjunctivae and EOM are normal. Pupils are equal, round, and reactive to light.   Neck: Normal range of motion. Neck supple.   Cardiovascular: Normal rate, regular rhythm and normal heart sounds.  Exam reveals no gallop and no friction rub.    No murmur heard.  Pulmonary/Chest: Effort normal and breath sounds normal. No respiratory distress. He has no wheezes. He has no rales.   Neurological: He is alert and oriented to person, place, and time.   Skin: Skin is warm and dry. He is not diaphoretic.   Psychiatric: He has a normal mood and affect. His behavior is normal.   Nursing note and vitals reviewed.      ASSESSMENT/PLAN     53-year-old gentleman with URI and sore throat.    1.  Will treat the patient with a Z-Ramon.  Claritin, Allegra, or Zyrtec may help to reduce postnasal drip and alleviate pressure.  Saltwater gargles and Chloraseptic spray may help with the throat pain.   Patient may take Tylenol alternating with Motrin every 4-6 hours when necessary for any fever or discomfort.    If symptoms worsen or fail to improve, patient should follow-up with his PCP.   23-Sep-2017 09:43

## 2017-09-23 NOTE — ED PROVIDER NOTE - PHYSICAL EXAMINATION
Gen: Alert, NAD Head: NC, AT, PERRL, EOMI, normal lids/conjunctiva ENT:nasal congestion, normal hearing, patent oropharynx without erythema/exudate, uvula midline Neck: +supple, no tenderness/meningismus/JVD, +Trachea midline Pulm: Bilateral BS, normal resp effort, no wheeze/stridor/retractions CV: RRR, no M/R/G, +dist pulses  Abd: soft, NT/ND, +BS, no hepatosplenomegaly Mskel: no edema/erythema/cyanosis Skin: no rash Neuro: AAOx3, no sensory/motor deficits, CN 2-12 intact

## 2017-09-23 NOTE — ED PROVIDER NOTE - OBJECTIVE STATEMENT
Pertinent PMH/PSH/FHx/SHx and Review of Systems contained within:  Patient presents to the ED for cough.  PMh of asthma.  Patient states started as URI and now with new chills and productive cough.  no other complaints.  no other concersn.  Non toxic.  Well appearing. speaking in full sentences.  no splinting.  lying supine.  smilling.  No aggravating or relieving factors. No other pertinent PMH.  No other pertinent PSH.  No other pertinent FHx.  Patient denies EtOH/tobacco/illicit substance use. No fever/chills, No photophobia/eye pain/changes in vision, No ear pain/sore throat/dysphagia, No chest pain/palpitations, no SOB/wheeze/stridor, No abdominal pain, No N/V/D, no dysuria/frequency/discharge, No neck/back pain, no rash, no changes in neurological status/function.

## 2017-09-23 NOTE — ED PROVIDER NOTE - MEDICAL DECISION MAKING DETAILS
Patient presnt with URI and then chills an productive cough in the setting of asthma.  d/w patient re: r/b/a of treatment ooptions.  will start steroids, nebs, abx.  VSS.  Uneventful ED observation period. smiling.  wants to d/c.  will f/u.  Uneventful ED observation period. Patient given prescription medications for their condition and advised to take them as prescribed and check with their Primary Care Provider if any questions arise. Discussed results and outcome of testing with the patient.  Patient advised to please follow up with their primary care doctor within the next 24 hours and return to the Emergency Department for worsening symptoms or any other concerns.  Patient advised that their doctor may call  to follow up on the specific results of the tests performed today in the emergency department.

## 2017-09-24 DIAGNOSIS — Z91.018 ALLERGY TO OTHER FOODS: ICD-10-CM

## 2017-09-24 DIAGNOSIS — J40 BRONCHITIS, NOT SPECIFIED AS ACUTE OR CHRONIC: ICD-10-CM

## 2017-09-24 DIAGNOSIS — R05 COUGH: ICD-10-CM

## 2020-05-04 NOTE — H&P ADULT. - FAMILY HISTORY
PATIENT CALLED AND STATED SHE ESTABLISHED CARE WITH [facility] ON 04/15/2020 VIA VIRTUAL VISIT. DURING THIS VISIT THE ATIENT STATES THAT SHE WAS TOLD HER MEDICATION COULD BE REFILLED SINCE SHE HAD ESTABLISHED CARE. THE PATIENT STATES SHE RECEIVED A LETTER IN THE MAIL FROM [facility] STATING HER MEDICATIONS HAD BEEN DENIED. TH PATIENT NEEDS REFILLS ON THE FOLLOWING MEDICATIONS;    meloxicam (MOBIC) 15 MG tablet    escitalopram (LEXAPRO) 10 MG tablet    THE PATIENT STATES THAT SHE IS RUNNING LOW ON THESE MEDICATIONS AND THAT SHE HAD BEEN TAKING THEM FOR A WHILE BEFORE SHE HAD ESTABLISHED CARE. THE PATIENT WOULD LIKE TO KNOW WHY THEY HAD BEEN DENIED OR IF THERE IS SOMETHING ELSE SHE NEEDS TO DO TO ENSURE SHE GETS HER REFILLS. PLEASE ADVISE.     PHARMACY IS [facility] ([facility])     PATIENT CALL BACK [phone]   No pertinent family history in first degree relatives

## 2020-07-08 NOTE — ED ADULT TRIAGE NOTE - HEIGHT IN INCHES
0 Quality 226: Preventive Care And Screening: Tobacco Use: Screening And Cessation Intervention: Patient screened for tobacco use and is an ex/non-smoker Quality 128: Preventive Care And Screening: Body Mass Index (Bmi) Screening And Follow-Up Plan: BMI is documented within normal parameters and no follow-up plan is required. Quality 131: Pain Assessment And Follow-Up: Pain assessment using a standardized tool is documented as negative, no follow-up plan required Quality 130: Documentation Of Current Medications In The Medical Record: Current Medications Documented Quality 111:Pneumonia Vaccination Status For Older Adults: Pneumococcal Vaccination Previously Received Quality 110: Preventive Care And Screening: Influenza Immunization: Influenza Immunization Administered during Influenza season Detail Level: Detailed

## 2023-04-24 ENCOUNTER — EMERGENCY (EMERGENCY)
Facility: HOSPITAL | Age: 28
LOS: 1 days | Discharge: HOME | End: 2023-04-24
Admitting: STUDENT IN AN ORGANIZED HEALTH CARE EDUCATION/TRAINING PROGRAM
Payer: COMMERCIAL

## 2023-04-24 VITALS
HEART RATE: 102 BPM | TEMPERATURE: 100 F | SYSTOLIC BLOOD PRESSURE: 105 MMHG | RESPIRATION RATE: 17 BRPM | DIASTOLIC BLOOD PRESSURE: 66 MMHG | OXYGEN SATURATION: 100 %

## 2023-04-24 DIAGNOSIS — Z98.89 OTHER SPECIFIED POSTPROCEDURAL STATES: Chronic | ICD-10-CM

## 2023-04-24 LAB
ALBUMIN SERPL ELPH-MCNC: 3.9 G/DL — SIGNIFICANT CHANGE UP (ref 3.3–5)
ALP SERPL-CCNC: 56 U/L — SIGNIFICANT CHANGE UP (ref 40–120)
ALT FLD-CCNC: 18 U/L — SIGNIFICANT CHANGE UP (ref 4–33)
ANION GAP SERPL CALC-SCNC: 13 MMOL/L — SIGNIFICANT CHANGE UP (ref 7–14)
APPEARANCE UR: CLEAR — SIGNIFICANT CHANGE UP
AST SERPL-CCNC: 21 U/L — SIGNIFICANT CHANGE UP (ref 4–32)
BACTERIA # UR AUTO: NEGATIVE — SIGNIFICANT CHANGE UP
BASE EXCESS BLDV CALC-SCNC: -2 MMOL/L — SIGNIFICANT CHANGE UP (ref -2–3)
BASOPHILS # BLD AUTO: 0.01 K/UL — SIGNIFICANT CHANGE UP (ref 0–0.2)
BASOPHILS NFR BLD AUTO: 0.2 % — SIGNIFICANT CHANGE UP (ref 0–2)
BILIRUB SERPL-MCNC: 0.3 MG/DL — SIGNIFICANT CHANGE UP (ref 0.2–1.2)
BILIRUB UR-MCNC: NEGATIVE — SIGNIFICANT CHANGE UP
BLOOD GAS VENOUS COMPREHENSIVE RESULT: SIGNIFICANT CHANGE UP
BUN SERPL-MCNC: 6 MG/DL — LOW (ref 7–23)
CALCIUM SERPL-MCNC: 8.8 MG/DL — SIGNIFICANT CHANGE UP (ref 8.4–10.5)
CHLORIDE BLDV-SCNC: 104 MMOL/L — SIGNIFICANT CHANGE UP (ref 96–108)
CHLORIDE SERPL-SCNC: 104 MMOL/L — SIGNIFICANT CHANGE UP (ref 98–107)
CO2 BLDV-SCNC: 25 MMOL/L — SIGNIFICANT CHANGE UP (ref 22–26)
CO2 SERPL-SCNC: 18 MMOL/L — LOW (ref 22–31)
COLOR SPEC: YELLOW — SIGNIFICANT CHANGE UP
CREAT SERPL-MCNC: 0.56 MG/DL — SIGNIFICANT CHANGE UP (ref 0.5–1.3)
DIFF PNL FLD: NEGATIVE — SIGNIFICANT CHANGE UP
EGFR: 128 ML/MIN/1.73M2 — SIGNIFICANT CHANGE UP
EOSINOPHIL # BLD AUTO: 0.01 K/UL — SIGNIFICANT CHANGE UP (ref 0–0.5)
EOSINOPHIL NFR BLD AUTO: 0.2 % — SIGNIFICANT CHANGE UP (ref 0–6)
EPI CELLS # UR: 4 /HPF — SIGNIFICANT CHANGE UP (ref 0–5)
FLUAV AG NPH QL: SIGNIFICANT CHANGE UP
FLUBV AG NPH QL: SIGNIFICANT CHANGE UP
GAS PNL BLDV: 134 MMOL/L — LOW (ref 136–145)
GLUCOSE BLDV-MCNC: 74 MG/DL — SIGNIFICANT CHANGE UP (ref 70–99)
GLUCOSE SERPL-MCNC: 82 MG/DL — SIGNIFICANT CHANGE UP (ref 70–99)
GLUCOSE UR QL: NEGATIVE — SIGNIFICANT CHANGE UP
HCO3 BLDV-SCNC: 24 MMOL/L — SIGNIFICANT CHANGE UP (ref 22–29)
HCT VFR BLD CALC: 35 % — SIGNIFICANT CHANGE UP (ref 34.5–45)
HCT VFR BLDA CALC: 37 % — SIGNIFICANT CHANGE UP (ref 34.5–46.5)
HGB BLD CALC-MCNC: 12.4 G/DL — SIGNIFICANT CHANGE UP (ref 11.7–16.1)
HGB BLD-MCNC: 11.7 G/DL — SIGNIFICANT CHANGE UP (ref 11.5–15.5)
HYALINE CASTS # UR AUTO: 1 /LPF — SIGNIFICANT CHANGE UP (ref 0–7)
IANC: 4.37 K/UL — SIGNIFICANT CHANGE UP (ref 1.8–7.4)
IMM GRANULOCYTES NFR BLD AUTO: 0.3 % — SIGNIFICANT CHANGE UP (ref 0–0.9)
KETONES UR-MCNC: ABNORMAL
LACTATE BLDV-MCNC: 1.4 MMOL/L — SIGNIFICANT CHANGE UP (ref 0.5–2)
LEUKOCYTE ESTERASE UR-ACNC: NEGATIVE — SIGNIFICANT CHANGE UP
LIDOCAIN IGE QN: 18 U/L — SIGNIFICANT CHANGE UP (ref 7–60)
LYMPHOCYTES # BLD AUTO: 0.91 K/UL — LOW (ref 1–3.3)
LYMPHOCYTES # BLD AUTO: 15.5 % — SIGNIFICANT CHANGE UP (ref 13–44)
MAGNESIUM SERPL-MCNC: 2 MG/DL — SIGNIFICANT CHANGE UP (ref 1.6–2.6)
MCHC RBC-ENTMCNC: 32.4 PG — SIGNIFICANT CHANGE UP (ref 27–34)
MCHC RBC-ENTMCNC: 33.4 GM/DL — SIGNIFICANT CHANGE UP (ref 32–36)
MCV RBC AUTO: 97 FL — SIGNIFICANT CHANGE UP (ref 80–100)
MONOCYTES # BLD AUTO: 0.56 K/UL — SIGNIFICANT CHANGE UP (ref 0–0.9)
MONOCYTES NFR BLD AUTO: 9.5 % — SIGNIFICANT CHANGE UP (ref 2–14)
NEUTROPHILS # BLD AUTO: 4.37 K/UL — SIGNIFICANT CHANGE UP (ref 1.8–7.4)
NEUTROPHILS NFR BLD AUTO: 74.3 % — SIGNIFICANT CHANGE UP (ref 43–77)
NITRITE UR-MCNC: NEGATIVE — SIGNIFICANT CHANGE UP
NRBC # BLD: 0 /100 WBCS — SIGNIFICANT CHANGE UP (ref 0–0)
NRBC # FLD: 0 K/UL — SIGNIFICANT CHANGE UP (ref 0–0)
PCO2 BLDV: 43 MMHG — SIGNIFICANT CHANGE UP (ref 39–52)
PH BLDV: 7.35 — SIGNIFICANT CHANGE UP (ref 7.32–7.43)
PH UR: 6 — SIGNIFICANT CHANGE UP (ref 5–8)
PLATELET # BLD AUTO: 126 K/UL — LOW (ref 150–400)
PO2 BLDV: 25 MMHG — SIGNIFICANT CHANGE UP (ref 25–45)
POTASSIUM BLDV-SCNC: 3.2 MMOL/L — LOW (ref 3.5–5.1)
POTASSIUM SERPL-MCNC: 3.3 MMOL/L — LOW (ref 3.5–5.3)
POTASSIUM SERPL-SCNC: 3.3 MMOL/L — LOW (ref 3.5–5.3)
PROT SERPL-MCNC: 7.2 G/DL — SIGNIFICANT CHANGE UP (ref 6–8.3)
PROT UR-MCNC: ABNORMAL
RBC # BLD: 3.61 M/UL — LOW (ref 3.8–5.2)
RBC # FLD: 12.1 % — SIGNIFICANT CHANGE UP (ref 10.3–14.5)
RBC CASTS # UR COMP ASSIST: 5 /HPF — HIGH (ref 0–4)
RSV RNA NPH QL NAA+NON-PROBE: SIGNIFICANT CHANGE UP
SAO2 % BLDV: 33.1 % — LOW (ref 67–88)
SARS-COV-2 RNA SPEC QL NAA+PROBE: SIGNIFICANT CHANGE UP
SODIUM SERPL-SCNC: 135 MMOL/L — SIGNIFICANT CHANGE UP (ref 135–145)
SP GR SPEC: 1.03 — SIGNIFICANT CHANGE UP (ref 1.01–1.05)
UROBILINOGEN FLD QL: SIGNIFICANT CHANGE UP
WBC # BLD: 5.88 K/UL — SIGNIFICANT CHANGE UP (ref 3.8–10.5)
WBC # FLD AUTO: 5.88 K/UL — SIGNIFICANT CHANGE UP (ref 3.8–10.5)
WBC UR QL: 3 /HPF — SIGNIFICANT CHANGE UP (ref 0–5)

## 2023-04-24 PROCEDURE — 99223 1ST HOSP IP/OBS HIGH 75: CPT

## 2023-04-24 PROCEDURE — 74177 CT ABD & PELVIS W/CONTRAST: CPT | Mod: 26,MA

## 2023-04-24 PROCEDURE — 71046 X-RAY EXAM CHEST 2 VIEWS: CPT | Mod: 26

## 2023-04-24 PROCEDURE — 71250 CT THORAX DX C-: CPT | Mod: 26,MA

## 2023-04-24 RX ORDER — SODIUM CHLORIDE 9 MG/ML
1000 INJECTION INTRAMUSCULAR; INTRAVENOUS; SUBCUTANEOUS ONCE
Refills: 0 | Status: COMPLETED | OUTPATIENT
Start: 2023-04-24 | End: 2023-04-24

## 2023-04-24 RX ORDER — CEFTRIAXONE 500 MG/1
1000 INJECTION, POWDER, FOR SOLUTION INTRAMUSCULAR; INTRAVENOUS ONCE
Refills: 0 | Status: COMPLETED | OUTPATIENT
Start: 2023-04-24 | End: 2023-04-24

## 2023-04-24 RX ORDER — POTASSIUM CHLORIDE 20 MEQ
40 PACKET (EA) ORAL ONCE
Refills: 0 | Status: COMPLETED | OUTPATIENT
Start: 2023-04-24 | End: 2023-04-24

## 2023-04-24 RX ORDER — ACETAMINOPHEN 500 MG
1000 TABLET ORAL ONCE
Refills: 0 | Status: COMPLETED | OUTPATIENT
Start: 2023-04-24 | End: 2023-04-24

## 2023-04-24 RX ORDER — ALBUTEROL 90 UG/1
2.5 AEROSOL, METERED ORAL ONCE
Refills: 0 | Status: COMPLETED | OUTPATIENT
Start: 2023-04-24 | End: 2023-04-24

## 2023-04-24 RX ORDER — DIPHENHYDRAMINE HCL 50 MG
25 CAPSULE ORAL ONCE
Refills: 0 | Status: COMPLETED | OUTPATIENT
Start: 2023-04-24 | End: 2023-04-24

## 2023-04-24 RX ORDER — ONDANSETRON 8 MG/1
4 TABLET, FILM COATED ORAL ONCE
Refills: 0 | Status: COMPLETED | OUTPATIENT
Start: 2023-04-24 | End: 2023-04-24

## 2023-04-24 RX ORDER — AZITHROMYCIN 500 MG/1
500 TABLET, FILM COATED ORAL EVERY 24 HOURS
Refills: 0 | Status: DISCONTINUED | OUTPATIENT
Start: 2023-04-24 | End: 2023-04-27

## 2023-04-24 RX ORDER — METOCLOPRAMIDE HCL 10 MG
10 TABLET ORAL ONCE
Refills: 0 | Status: COMPLETED | OUTPATIENT
Start: 2023-04-24 | End: 2023-04-24

## 2023-04-24 RX ORDER — ONDANSETRON 8 MG/1
4 TABLET, FILM COATED ORAL EVERY 8 HOURS
Refills: 0 | Status: DISCONTINUED | OUTPATIENT
Start: 2023-04-24 | End: 2023-04-27

## 2023-04-24 RX ORDER — LEVOFLOXACIN 5 MG/ML
1 INJECTION, SOLUTION INTRAVENOUS
Qty: 7 | Refills: 0
Start: 2023-04-24 | End: 2023-04-30

## 2023-04-24 RX ORDER — CEFTRIAXONE 500 MG/1
1000 INJECTION, POWDER, FOR SOLUTION INTRAMUSCULAR; INTRAVENOUS EVERY 24 HOURS
Refills: 0 | Status: DISCONTINUED | OUTPATIENT
Start: 2023-04-24 | End: 2023-04-27

## 2023-04-24 RX ORDER — KETOROLAC TROMETHAMINE 30 MG/ML
15 SYRINGE (ML) INJECTION ONCE
Refills: 0 | Status: DISCONTINUED | OUTPATIENT
Start: 2023-04-24 | End: 2023-04-24

## 2023-04-24 RX ORDER — AZITHROMYCIN 500 MG/1
500 TABLET, FILM COATED ORAL ONCE
Refills: 0 | Status: COMPLETED | OUTPATIENT
Start: 2023-04-24 | End: 2023-04-24

## 2023-04-24 RX ORDER — SODIUM CHLORIDE 9 MG/ML
1000 INJECTION, SOLUTION INTRAVENOUS
Refills: 0 | Status: DISCONTINUED | OUTPATIENT
Start: 2023-04-24 | End: 2023-04-27

## 2023-04-24 RX ADMIN — ONDANSETRON 4 MILLIGRAM(S): 8 TABLET, FILM COATED ORAL at 11:13

## 2023-04-24 RX ADMIN — Medication 15 MILLIGRAM(S): at 14:21

## 2023-04-24 RX ADMIN — Medication 25 MILLIGRAM(S): at 14:22

## 2023-04-24 RX ADMIN — SODIUM CHLORIDE 1000 MILLILITER(S): 9 INJECTION INTRAMUSCULAR; INTRAVENOUS; SUBCUTANEOUS at 14:21

## 2023-04-24 RX ADMIN — Medication 400 MILLIGRAM(S): at 11:13

## 2023-04-24 RX ADMIN — CEFTRIAXONE 100 MILLIGRAM(S): 500 INJECTION, POWDER, FOR SOLUTION INTRAMUSCULAR; INTRAVENOUS at 16:01

## 2023-04-24 RX ADMIN — SODIUM CHLORIDE 100 MILLILITER(S): 9 INJECTION, SOLUTION INTRAVENOUS at 23:55

## 2023-04-24 RX ADMIN — Medication 10 MILLIGRAM(S): at 14:21

## 2023-04-24 RX ADMIN — AZITHROMYCIN 255 MILLIGRAM(S): 500 TABLET, FILM COATED ORAL at 16:46

## 2023-04-24 RX ADMIN — ALBUTEROL 2.5 MILLIGRAM(S): 90 AEROSOL, METERED ORAL at 23:56

## 2023-04-24 RX ADMIN — Medication 40 MILLIEQUIVALENT(S): at 23:56

## 2023-04-24 RX ADMIN — Medication 1000 MILLIGRAM(S): at 11:40

## 2023-04-24 RX ADMIN — SODIUM CHLORIDE 1000 MILLILITER(S): 9 INJECTION INTRAMUSCULAR; INTRAVENOUS; SUBCUTANEOUS at 11:13

## 2023-04-24 NOTE — ED PROVIDER NOTE - PROGRESS NOTE DETAILS
LOLY Pool: received sign out from LOLY [place] to follow up labs, CT.  Received call from CT states contrast extravasated at IV site Left AC, IV was removed, pt reports minimal pain to site, compartments are soft, mild swelling at site noted, NV intact, warm compresses.  Pt examined beside with Dr Lloyd. Patient post CT scan with left IV infiltration AC. Patient with soft compartments reports mild tenderness to the area. Neurovascular intact in left upper extremity. Reports continued mild headache. Migraine cocktail ordered, pending repeat CT scan abdomen pelvis. Patient without neck stiffness or neck rigidity pt w/ ct showing ? right lung lesion vs pneumonia, pt reports cough w/ fever likely pneumonia, ct chest w/o contrast ordered, pt to receive ceftriaxone + azithro. PO trial, if tolerates will d/c on levquin 500mg x 7 days for pneumonia. w/ pulm / PCP f/u and repeat imaging in 4-6 weeks LOLY Pool: pt resting comfortably NAD, pt states she feels better, headache resolved.  Pt advised of results and need for CT chest given CT results.  Pt denies any complaints at present time.  LUE: mild swelling noted to forearm, NV intact, compartments are soft. LOLY Pool: CT chest showing pneumonia reviewed with Dr Mcadams advised to discharge home on Levaquin 750mg once a day x 7 days.  Pt feels better ambulating without difficulty, tolerating PO.  Results reviewed with patient.  Discharge reviewed and discussed with patient.  LUE NV intact, swelling to forearm resolved, compartments are soft non tender. LOLY Pool: CT chest showing pneumonia reviewed with Dr Mcadams advised to discharge home on Levaquin 750mg once a day x 7 days.  Pt feels better ambulating without difficulty, attempted to PO trial pt states feeling nauseous and unable to tolerating PO.  Will place in CDU for continued management.  Results reviewed and discussed with patient.  LUE NV intact, swelling to forearm resolved, compartments are soft non tender.  Pt aware and agreeable with plan.

## 2023-04-24 NOTE — ED ADULT NURSE REASSESSMENT NOTE - NS ED NURSE REASSESS COMMENT FT1
Pt a&ox4, resp even and unlabored, no acute distress in appearance. Pt IV extravasated at CT, removed prior to coming to RW. MD and PA aware of same and at bedside doing assessment of affected left extremity in AC region. Heat compress applied to area and elevated att. 20G IV placed in R ac. Will maintain safety and continue to monitor.

## 2023-04-24 NOTE — ED CDU PROVIDER INITIAL DAY NOTE - OBJECTIVE STATEMENT
28-year-old female past surgical history cholecystectomy in 2015 and asthma presents emergency room for evaluation of abdominal pain  X3 days.  Patient states she has been having right-sided abdominal pain associated with nausea vomiting and diarrhea.  She states she is unable patient states she had multiple episodes of bloody watery brown-colored diarrhea with multiple episodes of nonbilious nonbloody nausea and vomiting. pt states she also has been having fever where she did not take anything for it at home. pt denies recent travels or sick contacts.  CTAP negative for intra-abd pathology, ?RLL PNA, dedicated CT chest confirmed RLL PNA. Labs unremarkable. Pt given rocephin and azithromycin. Pt unable to tolerate PO, so will observe overnight in CDU for continuous IVF, anti-emetics and continued IV abx.

## 2023-04-24 NOTE — ED PROVIDER NOTE - ATTENDING APP SHARED VISIT CONTRIBUTION OF CARE
28-year-old female with past medical history of cholecystectomy presenting with abdominal pain x3 days. Patient states she had some abdominal discomfort with some loose stools for the last 2 to 3 days. States that she had some weakness and fatigue as well along with 1 episode of nonbloody nonbilious vomiting. Patient states she also has mild headache today. She denies recent travel or sick contacts. Patient noted to have low-grade fever here in the ER. Mildly tender abdomen in the right lower quadrant  denies fever, chills, chest pain, SOB, +abdominal pain, diarrhea, dysuria, syncope, bleeding, new rash,weakness, numbness, blurred vision  +n/v   ROS  otherwise negative as per HPI  Gen: Awake, Alert, WD, WN, NAD  Head:  NC/AT  Eyes:  PERRL, EOMI, Conjunctiva pink, lids normal, no scleral icterus  ENT: OP clear, no exudates, , moist mucus membranes  Neck: supple, nontender, no meningismus, no JVD, trachea midline  Cardiac/CV:  S1 S2, RRR, no M/G/R  Respiratory/Pulm:  CTAB, good air movement, normal resp effort, no wheezes/stridor/retractions/rales/rhonchi  Gastrointestinal/Abdomen:  Soft,tender, nondistended, +BS, no rebound/guarding  Back:  no CVAT, no MLT  Ext:  warm, well perfused, moving all extremities spontaneously, no peripheral edema, distal pulses intact  Skin: intact, no rash  Neuro:  AAOx3, sensation intact, motor 5/5 x 4 extremities, normal gait, speech clear  mdm as above

## 2023-04-24 NOTE — ED ADULT NURSE REASSESSMENT NOTE - NS ED NURSE REASSESS COMMENT FT1
Resting comfortably on stretcher. denies any pain, not in acute distress. VSS. Safety maintained. Alert and oriented x 4

## 2023-04-24 NOTE — ED PROVIDER NOTE - WR ORDER ID 1
88 yo M with h/o COPD, HTN, HLD, p/w acute hypercarbic respiratory failure due to COPD exacerbation. GI called to evaluate pt this evening (7/9) after pt had chicken for lunch and felt dysphagia afterwards. ENT was called for evaluation after sensation of chicken stuck in throat. ENT performed indirect laryngoscopy which was clear for any foreign body pt. Pt currently tolerating secretions and PO liquids w/o difficulty. Reports he has never had an EGD before. s/p glucagon 1 mg x1 IV on 7/9.     Impression:  #Dysphagia- c/f food impaction; may have underlying chronic aspiration; per nurse pt's grandchildren report frequent coughing after meals; currently not tolerating secretions; Differential for chronic dysphagia/aspiration includes oropharyngeal phase (neurological, muscular weakness) vs. esophageal phase (ring vs. EoE vs. malignancy    Recommendations:  - keep NPO   - recommend pt remain NPO until formal SLP eval including MBS given reported coughing after eating at home  - aspiration precautions  - plan for emergent EGD 7/10  - [facility]    **THIS NOTE IS NOT FINALIZED UNTIL SIGNED BY THE ATTENDING**    Gaby Medrano MD  GI Fellow, PGY-5  Available via [employer] Teams    NON-URGENT CONSULTS:  Please email [email or web address]  [email or web address]  AT NIGHT AND ON WEEKENDS:  Contact on-call GI fellow via answering service ([phone]) from 5pm-8am and on weekends/holidays  MONDAY-FRIDAY 8AM-5PM:  Pager# [phone] ([facility]) or [phone] ([facility])  GI Phone# [phone] ([facility]) 86 yo M with h/o COPD, HTN, HLD, p/w acute hypercarbic respiratory failure due to COPD exacerbation. GI called to evaluate pt this evening (7/9) after pt had chicken for lunch and felt dysphagia afterwards. ENT was called for evaluation after sensation of chicken stuck in throat. ENT performed indirect laryngoscopy which was clear for any foreign body pt. Pt currently tolerating secretions and PO liquids w/o difficulty. Reports he has never had an EGD before. s/p glucagon 1 mg x1 IV on 7/9.     Impression:  #Dysphagia- c/f food impaction; may have underlying chronic aspiration; per nurse pt's grandchildren report frequent coughing after meals; currently not tolerating secretions; Differential for chronic dysphagia/aspiration includes oropharyngeal phase (neurological, muscular weakness) vs. esophageal phase (ring vs. EoE vs. malignancy    Recommendations:  - keep NPO   - recommend pt remain NPO until formal SLP eval including MBS given reported coughing after eating at home  - aspiration precautions  - plan for emergent EGD 7/10  - can give 1 mg IV glucagon now  - mIVF    **THIS NOTE IS NOT FINALIZED UNTIL SIGNED BY THE ATTENDING**    Gaby Medrano MD  GI Fellow, PGY-5  Available via [employer] Teams    NON-URGENT CONSULTS:  Please email [email or web address]  AT NIGHT AND ON WEEKENDS:  Contact on-call GI fellow via answering service ([phone]) from 5pm-8am and on weekends/holidays  MONDAY-FRIDAY 8AM-5PM:  Pager# [phone] ([facility]) or [phone] ([facility])  GI Phone# [phone] ([facility]) [phone]

## 2023-04-24 NOTE — ED PROVIDER NOTE - OBJECTIVE STATEMENT
28-year-old female past surgical history cholecystectomy in 2015 and asthma presents emergency room for evaluation of abdominal pain  X3 days.  Patient states she has been having right-sided abdominal pain associated with nausea vomiting and diarrhea.  She states she is unable patient states she had multiple episodes of bloody watery brown-colored diarrhea with multiple episodes of nonbilious nonbloody nausea and vomiting. pt states she also has been having fever where she did not take anything for it at home. pt denies recent travels or sick contacts.

## 2023-04-24 NOTE — ED PROVIDER NOTE - NSFOLLOWUPINSTRUCTIONS_ED_ALL_ED_FT
Follow up with your Doctor in 1-2 days.  Follow up with Pulmonology in 1-2 days see attached list.  Take Levaquin 750mg one tablet orally once x 7 days.  Rest.  Drink plenty of fluids.  Take Tylenol 650mg orally every 6 hours as needed for fever/pain.  Return to the ER for any persistent/worsening or new symptoms weakness, dizziness, chest pain, shortness of breath, abdominal pain or any concerning symptoms.

## 2023-04-24 NOTE — ED PROVIDER NOTE - CLINICAL SUMMARY MEDICAL DECISION MAKING FREE TEXT BOX
28-year-old female past surgical history cholecystectomy in 2015 and asthma presents emergency room for evaluation of abdominal pain  X3 days associated with N/V/D. DDX is colitis vs appendicitis vs flu?  will do labs, UA, CT a/p, pain control

## 2023-04-24 NOTE — ED ADULT NURSE NOTE - OBJECTIVE STATEMENT
Pt A+Ox4.  Pt c/o abdominal pain that started 3 days ago.  Pt states she has been having N/V/D with a cough and fevers.  pt states she last took motrin for fever at 2 am.  Lungs clear, abdomen soft, mild tenderness RUQ.  IV placed in left AC#20. IVF infusing.

## 2023-04-24 NOTE — ED PROVIDER NOTE - PATIENT PORTAL LINK FT
You can access the FollowMyHealth Patient Portal offered by [facility] by registering at the following website: [email or web address]. By joining [employer]’s FollowMyHealth portal, you will also be able to view your health information using other applications (apps) compatible with our system.

## 2023-04-24 NOTE — ED ADULT NURSE NOTE - NS_SISCREENINGSR_GEN_ALL_ED
Chart and labs reviewed for length of stay. No nutrition intervention indicated at this time. RD available via consult. Will continue to follow per protocol.    Negative

## 2023-04-24 NOTE — ED ADULT TRIAGE NOTE - CHIEF COMPLAINT QUOTE
Pt c/o of dizziness r/t not eating and drinking properly for 5 days. States she is experiencing RUQ abdominal pain, vomiting, diarrhea. fs 85. Denies chest pain, headache.

## 2023-04-24 NOTE — ED PROVIDER NOTE - PROGRESS NOTE ADDITIONAL2
Additional Progress Note... Interpolation Flap Text: A decision was made to reconstruct the defect utilizing an interpolation axial flap and a staged reconstruction.  A telfa template was made of the defect.  This telfa template was then used to outline the interpolation flap.  The donor area for the pedicle flap was then injected with anesthesia.  The flap was excised through the skin and subcutaneous tissue down to the layer of the underlying musculature.  The interpolation flap was carefully excised within this deep plane to maintain its blood supply.  The edges of the donor site were undermined.   The donor site was closed in a primary fashion.  The pedicle was then rotated into position and sutured.  Once the tube was sutured into place, adequate blood supply was confirmed with blanching and refill.  The pedicle was then wrapped with xeroform gauze and dressed appropriately with a telfa and gauze bandage to ensure continued blood supply and protect the attached pedicle.

## 2023-04-24 NOTE — ED CDU PROVIDER INITIAL DAY NOTE - ATTENDING APP SHARED VISIT CONTRIBUTION OF CARE
29 yo F hx cholecystectomy and asthma, pw c/o abdominal pain x 3 days with nv and diarrhea. CT abd/pelv without acute findings but shows opacification in lung and CT chest shows RLL pna. Pt unable to tolerate PO, plan for CDU for iv abx, anti-emetics and IVF

## 2023-04-25 VITALS
TEMPERATURE: 99 F | HEART RATE: 100 BPM | DIASTOLIC BLOOD PRESSURE: 78 MMHG | OXYGEN SATURATION: 100 % | RESPIRATION RATE: 19 BRPM | SYSTOLIC BLOOD PRESSURE: 121 MMHG

## 2023-04-25 LAB
CULTURE RESULTS: SIGNIFICANT CHANGE UP
LEGIONELLA AG UR QL: NEGATIVE — SIGNIFICANT CHANGE UP
SPECIMEN SOURCE: SIGNIFICANT CHANGE UP

## 2023-04-25 PROCEDURE — 99238 HOSP IP/OBS DSCHRG MGMT 30/<: CPT

## 2023-04-25 RX ORDER — ACETAMINOPHEN 500 MG
975 TABLET ORAL EVERY 6 HOURS
Refills: 0 | Status: DISCONTINUED | OUTPATIENT
Start: 2023-04-25 | End: 2023-04-27

## 2023-04-25 RX ORDER — AZITHROMYCIN 500 MG/1
1 TABLET, FILM COATED ORAL
Qty: 3 | Refills: 0
Start: 2023-04-25 | End: 2023-04-27

## 2023-04-25 RX ORDER — ONDANSETRON 8 MG/1
1 TABLET, FILM COATED ORAL
Qty: 9 | Refills: 0
Start: 2023-04-25 | End: 2023-04-27

## 2023-04-25 RX ORDER — IPRATROPIUM/ALBUTEROL SULFATE 18-103MCG
3 AEROSOL WITH ADAPTER (GRAM) INHALATION ONCE
Refills: 0 | Status: COMPLETED | OUTPATIENT
Start: 2023-04-25 | End: 2023-04-25

## 2023-04-25 RX ORDER — ACETAMINOPHEN 500 MG
975 TABLET ORAL ONCE
Refills: 0 | Status: DISCONTINUED | OUTPATIENT
Start: 2023-04-25 | End: 2023-04-25

## 2023-04-25 RX ADMIN — Medication 3 MILLILITER(S): at 09:42

## 2023-04-25 NOTE — ED CDU PROVIDER DISPOSITION NOTE - CLINICAL COURSE
29 y/o female pmh asthma c/o abd pain, n/v/d x3 days. Pt had labs and CT scan revealing a RLL PNA. Pt had CT chest confirming PNA. Pt was unable to tolerate PO so was placed in the CDU for further care. Pt did well overnight and had no more vomiting. Pt was able to tolerate PO in the AM and stable for dc. Will dc on zithromax and augmentin.

## 2023-04-25 NOTE — ED CDU PROVIDER DISPOSITION NOTE - PATIENT PORTAL LINK FT
You can access the FollowMyHealth Patient Portal offered by [facility] by registering at the following website: [email or web address]. By joining [employer]’s FollowMyHealth portal, you will also be able to view your health information using other applications (apps) compatible with our system.

## 2023-04-25 NOTE — ED CDU PROVIDER SUBSEQUENT DAY NOTE - PROGRESS NOTE DETAILS
Pt feeling better after ER stat tolerating PO. Will dc on zofran, zithromax and augmentin. stable for dc

## 2023-04-25 NOTE — ED CDU PROVIDER SUBSEQUENT DAY NOTE - ATTENDING APP SHARED VISIT CONTRIBUTION OF CARE
I have personally performed a face to face medical and diagnostic evaluation of the patient. I have discussed with and reviewed the ACP's note and agree with the History, ROS, Physical Exam and MDM unless otherwise indicated. A brief summary of my personal evaluation and impression can be found below.     28-year-old female with past surgical history of cholecystectomy, presenting to the ER initially for chief complaint of abdominal pain for 3 days associated with nausea, vomiting, diarrhea.  Initial CT of the abdomen pelvis negative for any intra abdominal pathology, but commented on possible right lower lobe pneumonia.  Patient placed in CDU for monitoring, IV antibiotics for pneumonia, as well as symptomatic management for p.o. tolerance.    Patient states that she feels better, but still hesitant to eat. Reassess to dispo. Andres Holder, ED Attending,

## 2023-04-25 NOTE — ED CDU PROVIDER DISPOSITION NOTE - ATTENDING CONTRIBUTION TO CARE
I have personally performed a face to face medical and diagnostic evaluation of the patient. I have discussed with and reviewed the ACP's note and agree with the History, ROS, Physical Exam and MDM unless otherwise indicated. A brief summary of my personal evaluation and impression can be found below.     28-year-old female with past surgical history of cholecystectomy, presenting to the ER initially for chief complaint of abdominal pain for 3 days associated with nausea, vomiting, diarrhea.  Initial CT of the abdomen pelvis negative for any intra abdominal pathology, but commented on possible right lower lobe pneumonia.  Patient placed in CDU for monitoring, IV antibiotics for pneumonia, as well as symptomatic management for p.o. tolerance.   tolerating p.o., no new complaints.  Given return precautions and follow-up instructions with teach back.  Sent home with antibiotics.  Plan for DC. Andres Holder, ED Attending,

## 2023-04-25 NOTE — ED CDU PROVIDER SUBSEQUENT DAY NOTE - HISTORY
28-year-old female past surgical history cholecystectomy in 2015 and asthma presents emergency room for evaluation of abdominal pain X3 days.    CTAP negative for intra-abd pathology, ?RLL PNA, dedicated CT chest confirmed RLL PNA. Labs unremarkable. Pt given rocephin and azithromycin. Pt unable to tolerate PO, so will observe in CDU for continuous IVF, anti-emetics and continued IV abx.

## 2023-04-30 LAB
CULTURE RESULTS: SIGNIFICANT CHANGE UP
CULTURE RESULTS: SIGNIFICANT CHANGE UP
SPECIMEN SOURCE: SIGNIFICANT CHANGE UP
SPECIMEN SOURCE: SIGNIFICANT CHANGE UP

## 2023-05-16 NOTE — ED ADULT NURSE NOTE - NS ED NOTE ABUSE SUSPICION NEGLECT YN
no s/p casting under procedural sedation, post reduction xrays reviewed and demonstrate improved alignment.  NB intact, awake and alert and tolerating po, stable for dc home.  f/up w ortho in 1 week.  cast care and return precautions discussed. --MD Darshan

## 2023-06-05 NOTE — PATIENT PROFILE ADULT. - NSTOBACCO TYPE_GEN_A_CORE_RD
bathroom at night)    Fatigue/Energy level  Stress      On the other side of the sheet they can track their food, drink, environment, activity, symptoms etc      Avoiding Latex-like proteins in my foods; Avocados, Bananas, Celery, Figs & Kiwi proteins have latex-like proteins to inflame our immune systems, plus 47 more foods  How Can I Have A Latex Allergy? Eating foods with latex-like protein exposes us to latex allergies. Our body cannot tell the differencebetween these latex-like proteins and latex from rubber products since many people are allergic to fruit, vegetables and latex. Read labels on pre-packaged foods. This list to avoid is only a guide if you are known allergicto latex or have a latex rash on your chin, cheeks and lines on your neck and chest. The amount of latex is different in each food product or fruit variety. Avoid out of Season if not grown locally:   Melon, Nectarine, Papaya, Cherry, Passion fruit, Plum, Chestnuts, and Tomato. Avocado, Banana, Celery, Figs, and Kiwi always contain Latex-like protein. Whats in Season? Strawberries taste better in June than December because June is strawberry season so buy locally grown produce \"in season\" for the best flavor, cost, and less Latex. Locally grown produce not only tastes great but also requires little or no ethylene exposure in food distribution so has less latex content. Out of season: use canned, frozen, or dried since those are processed ripe and latex content is lower!!!     Month     [place] Locally Grown Produce  January, February, March: use canned, frozen or dried fruits since lower in latex  April: asparagus, radishes  May: asparagus, broccoli, green onions, greens, peas, radishes, rhubarb  Brenda: asparagus, beets, beans, broccoli, cabbage, cantaloupe, carrots, green onions, greens, lettuce, onions, parsley, peas, radishes, rhubarb, strawberries, watermelons  July: beans, beets, blueberries, broccoli, cabbage, cantaloupe, carrots,
Cigarettes

## 2023-10-18 NOTE — ED ADULT TRIAGE NOTE - CCCP TRG CHIEF CMPLNT
abdominal pain Otezla Pregnancy And Lactation Text: This medication is Pregnancy Category C and it isn't known if it is safe during pregnancy. It is unknown if it is excreted in breast milk.

## 2024-01-06 NOTE — ED PROVIDER NOTE - RESPIRATORY, MLM
Please call your primary care provider's office on Monday morning to schedule follow-up for early next week    Return to the emergency department if symptoms worsen, shortness of breath or any other emergent concerns   Breath sounds clear and equal bilaterally.

## 2024-03-13 ENCOUNTER — EMERGENCY (EMERGENCY)
Facility: HOSPITAL | Age: 29
LOS: 1 days | Discharge: HOME | End: 2024-03-13
Attending: EMERGENCY MEDICINE | Admitting: STUDENT IN AN ORGANIZED HEALTH CARE EDUCATION/TRAINING PROGRAM
Payer: MEDICAID

## 2024-03-13 VITALS
DIASTOLIC BLOOD PRESSURE: 75 MMHG | TEMPERATURE: 98 F | SYSTOLIC BLOOD PRESSURE: 126 MMHG | RESPIRATION RATE: 18 BRPM | OXYGEN SATURATION: 100 % | HEART RATE: 91 BPM

## 2024-03-13 DIAGNOSIS — Z98.89 OTHER SPECIFIED POSTPROCEDURAL STATES: Chronic | ICD-10-CM

## 2024-03-13 PROCEDURE — 93010 ELECTROCARDIOGRAM REPORT: CPT

## 2024-03-13 PROCEDURE — 99284 EMERGENCY DEPT VISIT MOD MDM: CPT

## 2024-03-13 NOTE — ED ADULT TRIAGE NOTE - CHIEF COMPLAINT QUOTE
c/o shortness of breath and chest discomfort tonight, also endorses n/v about 6 episodes tonight. received one albuterol nebulizer and 4mg of zofran en rounte. hx of pneumonia admitted a couple weeks ago.

## 2024-03-14 VITALS
SYSTOLIC BLOOD PRESSURE: 130 MMHG | RESPIRATION RATE: 16 BRPM | TEMPERATURE: 98 F | OXYGEN SATURATION: 100 % | DIASTOLIC BLOOD PRESSURE: 72 MMHG | HEART RATE: 85 BPM

## 2024-03-14 LAB
ALBUMIN SERPL ELPH-MCNC: 3.9 G/DL — SIGNIFICANT CHANGE UP (ref 3.3–5)
ALP SERPL-CCNC: 59 U/L — SIGNIFICANT CHANGE UP (ref 40–120)
ALT FLD-CCNC: 25 U/L — SIGNIFICANT CHANGE UP (ref 4–33)
ANION GAP SERPL CALC-SCNC: 15 MMOL/L — HIGH (ref 7–14)
AST SERPL-CCNC: 26 U/L — SIGNIFICANT CHANGE UP (ref 4–32)
BASOPHILS # BLD AUTO: 0.02 K/UL — SIGNIFICANT CHANGE UP (ref 0–0.2)
BASOPHILS NFR BLD AUTO: 0.1 % — SIGNIFICANT CHANGE UP (ref 0–2)
BILIRUB SERPL-MCNC: 0.4 MG/DL — SIGNIFICANT CHANGE UP (ref 0.2–1.2)
BUN SERPL-MCNC: 14 MG/DL — SIGNIFICANT CHANGE UP (ref 7–23)
CALCIUM SERPL-MCNC: 8.6 MG/DL — SIGNIFICANT CHANGE UP (ref 8.4–10.5)
CHLORIDE SERPL-SCNC: 104 MMOL/L — SIGNIFICANT CHANGE UP (ref 98–107)
CO2 SERPL-SCNC: 18 MMOL/L — LOW (ref 22–31)
CREAT SERPL-MCNC: 0.58 MG/DL — SIGNIFICANT CHANGE UP (ref 0.5–1.3)
EGFR: 126 ML/MIN/1.73M2 — SIGNIFICANT CHANGE UP
EOSINOPHIL # BLD AUTO: 0.4 K/UL — SIGNIFICANT CHANGE UP (ref 0–0.5)
EOSINOPHIL NFR BLD AUTO: 3 % — SIGNIFICANT CHANGE UP (ref 0–6)
FLUAV AG NPH QL: SIGNIFICANT CHANGE UP
FLUBV AG NPH QL: SIGNIFICANT CHANGE UP
GLUCOSE SERPL-MCNC: 88 MG/DL — SIGNIFICANT CHANGE UP (ref 70–99)
HCG SERPL-ACNC: <1 MIU/ML — SIGNIFICANT CHANGE UP
HCT VFR BLD CALC: 35.4 % — SIGNIFICANT CHANGE UP (ref 34.5–45)
HGB BLD-MCNC: 12.4 G/DL — SIGNIFICANT CHANGE UP (ref 11.5–15.5)
IANC: 10.66 K/UL — HIGH (ref 1.8–7.4)
IMM GRANULOCYTES NFR BLD AUTO: 0.3 % — SIGNIFICANT CHANGE UP (ref 0–0.9)
LYMPHOCYTES # BLD AUTO: 1.55 K/UL — SIGNIFICANT CHANGE UP (ref 1–3.3)
LYMPHOCYTES # BLD AUTO: 11.5 % — LOW (ref 13–44)
MCHC RBC-ENTMCNC: 32.9 PG — SIGNIFICANT CHANGE UP (ref 27–34)
MCHC RBC-ENTMCNC: 35 GM/DL — SIGNIFICANT CHANGE UP (ref 32–36)
MCV RBC AUTO: 93.9 FL — SIGNIFICANT CHANGE UP (ref 80–100)
MONOCYTES # BLD AUTO: 0.86 K/UL — SIGNIFICANT CHANGE UP (ref 0–0.9)
MONOCYTES NFR BLD AUTO: 6.4 % — SIGNIFICANT CHANGE UP (ref 2–14)
NEUTROPHILS # BLD AUTO: 10.66 K/UL — HIGH (ref 1.8–7.4)
NEUTROPHILS NFR BLD AUTO: 78.7 % — HIGH (ref 43–77)
NRBC # BLD: 0 /100 WBCS — SIGNIFICANT CHANGE UP (ref 0–0)
NRBC # FLD: 0 K/UL — SIGNIFICANT CHANGE UP (ref 0–0)
PLATELET # BLD AUTO: 272 K/UL — SIGNIFICANT CHANGE UP (ref 150–400)
POTASSIUM SERPL-MCNC: 3.6 MMOL/L — SIGNIFICANT CHANGE UP (ref 3.5–5.3)
POTASSIUM SERPL-SCNC: 3.6 MMOL/L — SIGNIFICANT CHANGE UP (ref 3.5–5.3)
PROT SERPL-MCNC: 7.2 G/DL — SIGNIFICANT CHANGE UP (ref 6–8.3)
RBC # BLD: 3.77 M/UL — LOW (ref 3.8–5.2)
RBC # FLD: 11.4 % — SIGNIFICANT CHANGE UP (ref 10.3–14.5)
RSV RNA NPH QL NAA+NON-PROBE: SIGNIFICANT CHANGE UP
SARS-COV-2 RNA SPEC QL NAA+PROBE: SIGNIFICANT CHANGE UP
SODIUM SERPL-SCNC: 137 MMOL/L — SIGNIFICANT CHANGE UP (ref 135–145)
WBC # BLD: 13.53 K/UL — HIGH (ref 3.8–10.5)
WBC # FLD AUTO: 13.53 K/UL — HIGH (ref 3.8–10.5)

## 2024-03-14 PROCEDURE — 71046 X-RAY EXAM CHEST 2 VIEWS: CPT | Mod: 26

## 2024-03-14 RX ORDER — SODIUM CHLORIDE 9 MG/ML
1000 INJECTION INTRAMUSCULAR; INTRAVENOUS; SUBCUTANEOUS ONCE
Refills: 0 | Status: COMPLETED | OUTPATIENT
Start: 2024-03-14 | End: 2024-03-14

## 2024-03-14 RX ORDER — DEXAMETHASONE 0.5 MG/5ML
6 ELIXIR ORAL ONCE
Refills: 0 | Status: COMPLETED | OUTPATIENT
Start: 2024-03-14 | End: 2024-03-14

## 2024-03-14 RX ORDER — ALBUTEROL 90 UG/1
2 AEROSOL, METERED ORAL ONCE
Refills: 0 | Status: COMPLETED | OUTPATIENT
Start: 2024-03-14 | End: 2024-03-14

## 2024-03-14 RX ORDER — IPRATROPIUM/ALBUTEROL SULFATE 18-103MCG
3 AEROSOL WITH ADAPTER (GRAM) INHALATION ONCE
Refills: 0 | Status: COMPLETED | OUTPATIENT
Start: 2024-03-14 | End: 2024-03-14

## 2024-03-14 RX ORDER — ACETAMINOPHEN 500 MG
1000 TABLET ORAL ONCE
Refills: 0 | Status: COMPLETED | OUTPATIENT
Start: 2024-03-14 | End: 2024-03-14

## 2024-03-14 RX ADMIN — ALBUTEROL 2 PUFF(S): 90 AEROSOL, METERED ORAL at 04:02

## 2024-03-14 RX ADMIN — Medication 400 MILLIGRAM(S): at 00:44

## 2024-03-14 RX ADMIN — Medication 3 MILLILITER(S): at 00:45

## 2024-03-14 RX ADMIN — Medication 6 MILLIGRAM(S): at 00:45

## 2024-03-14 RX ADMIN — SODIUM CHLORIDE 1000 MILLILITER(S): 9 INJECTION INTRAMUSCULAR; INTRAVENOUS; SUBCUTANEOUS at 00:45

## 2024-03-14 NOTE — ED ADULT NURSE NOTE - OBJECTIVE STATEMENT
Pt received to intake rm 5, A&Ox4. Pt c/o SOB, chest tightness starting tonight. Pt also endorses N/V earlier tonight, but denies at this time. Pt recently admitted for pneumonia. Pt denies fevers, chills, headache, dizziness, palpitations, weakness, sick contacts.  18G IV established to left AC by EMS prior to arrival, medicated prior to arrival (nebulizer, 4 mg zofran). IVL clear, patent. Labs drawn and sent. medicated per MAR. Respirations even and mildly labored, audible expiratory wheezes noted. pending xray

## 2024-03-14 NOTE — ED PROVIDER NOTE - CLINICAL SUMMARY MEDICAL DECISION MAKING FREE TEXT BOX
Patient with history of asthma, uses albuterol as needed, no intubations since childhood, presents emergency department with 2 days of rhinorrhea, sore throat, chills, shortness of breath and chest tightness and associated cough.  She also had a few episodes of vomiting earlier today.  Denies any abdominal pain, back pain, dysuria. Using albuterol nebulizer at home without much relief.  En route with EMS patient received Zofran through an IV as well as nebulizer treatment.  On my evaluation, patient is in no acute respiratory distress, no wheezing, there are no focal abnl BS to raise concern for PNA. Abdomen is soft nontender, no flank pain. Pt had similar presentation w/ pna in April 2023 however currently constellation of symptoms more c/w viral URI than bacterial pna. Will obtain cxr to eval for possible pna though low suspicion given no focal findings on pulm exam. Labs pending, will nebs and steroids, swab, reasses.

## 2024-03-14 NOTE — ED PROVIDER NOTE - PATIENT PORTAL LINK FT
You can access the FollowMyHealth Patient Portal offered by [facility] by registering at the following website: [email or web address]. By joining [employer]’s FollowMyHealth portal, you will also be able to view your health information using other applications (apps) compatible with our system.

## 2024-03-14 NOTE — ED PROVIDER NOTE - PROGRESS NOTE DETAILS
Pt signed out to me - reassessed, feels better but still some residual chest discomfort. Updated on findings. Will give pt inhaler to go home with (pt's pharmacy back ordered). Will prescribe abx. Given return precautions and follow up info with teach back. Plan for DC. Andres Holder, ED Attending

## 2024-03-14 NOTE — ED PROVIDER NOTE - PHYSICAL EXAMINATION
GEN - NAD; well appearing; A+O x3   HEAD - NC/AT   ENT: Airway patent, mmm, +rhinorrhea, OP clear no tonsilitis, PTA< exudates, uvula midline   PULMONARY - CTA b/l, symmetric breath sounds.   CARDIAC -s1s2, RRR, no M,G,R  ABDOMEN - +BS, ND, NT, soft, no guarding, no rebound, no masses   BACK - no CVA tenderness, Normal  spine   EXTREMITIES - FROM, symmetric pulses, capillary refill < 2 seconds, no edema   SKIN - no rash or bruising   NEUROLOGIC - alert, speech clear, no focal deficits  PSYCH -nl mood/affect, nl insight.

## 2024-03-14 NOTE — ED PROVIDER NOTE - NSFOLLOWUPINSTRUCTIONS_ED_ALL_ED_FT
See attached information on cough/asthma.     Return to the ER for any new or worsening symptoms.     Follow up with your primary care doctor as discussed.      your prescription and take as directed.    Cough, Adult    Coughing is a reflex that clears your throat and airways (respiratory system). It helps heal and protect your lungs. It is normal to cough from time to time. A cough that happens with other symptoms or that lasts a long time may be a sign of a condition that needs treatment. A short-term (acute) cough may only last 2–3 weeks. A long-term (chronic) cough may last 8 or more weeks.    Coughing is often caused by:  Diseases, such as:  An infection of the respiratory system.  Asthma or other heart or lung diseases.  Gastroesophageal reflux. This is when acid comes back up from the stomach.  Breathing in things that irritate your lungs.  Allergies.  Postnasal drip. This is when mucus runs down the back of your throat.  Smoking.  Some medicines.  Follow these instructions at home:  Medicines    Take over-the-counter and prescription medicines only as told by your health care provider.  Talk with your provider before you take cough medicine (cough suppressants).  Eating and drinking    Do not drink alcohol.  Avoid caffeine.  Drink enough fluid to keep your pee (urine) pale yellow.  Lifestyle    Avoid cigarette smoke.  Do not use any products that contain nicotine or tobacco. These products include cigarettes, chewing tobacco, and vaping devices, such as e-cigarettes. If you need help quitting, ask your provider.  Avoid things that make you cough. These may include perfumes, candles, cleaning products, or campfire smoke.  General instructions    A person holding a cloth over the mouth and nose while coughing.  Watch for any changes to your cough. Tell your provider about them.  Always cover your mouth when you cough.  If the air is dry in your bedroom or home, use a cool mist vaporizer or humidifier.  If your cough is worse at night, try to sleep in a semi-upright position.  Rest as needed.  Contact a health care provider if:  You have new symptoms, or your symptoms get worse.  You cough up pus.  You have a fever that does not go away or a cough that does not get better after 2–3 weeks.  You cannot control your cough with medicine, and you are losing sleep.  You have pain that gets worse or is not helped with medicine.  You lose weight for no clear reason.  You have night sweats.  Get help right away if:  You cough up blood.  You have trouble breathing.  Your heart is beating very fast.  These symptoms may be an emergency. Get help right away. Call 911.  Do not wait to see if the symptoms will go away.  Do not drive yourself to the hospital.  This information is not intended to replace advice given to you by your health care provider. Make sure you discuss any questions you have with your health care provider.

## 2024-07-23 ENCOUNTER — EMERGENCY (EMERGENCY)
Facility: HOSPITAL | Age: 29
LOS: 0 days | Discharge: LEFT BEFORE DISCHARGE | End: 2024-07-23
Attending: STUDENT IN AN ORGANIZED HEALTH CARE EDUCATION/TRAINING PROGRAM
Payer: MEDICAID

## 2024-07-23 VITALS
SYSTOLIC BLOOD PRESSURE: 113 MMHG | RESPIRATION RATE: 17 BRPM | HEART RATE: 69 BPM | OXYGEN SATURATION: 100 % | TEMPERATURE: 98 F | DIASTOLIC BLOOD PRESSURE: 77 MMHG

## 2024-07-23 VITALS
DIASTOLIC BLOOD PRESSURE: 73 MMHG | TEMPERATURE: 99 F | SYSTOLIC BLOOD PRESSURE: 111 MMHG | WEIGHT: 139.99 LBS | OXYGEN SATURATION: 100 % | RESPIRATION RATE: 18 BRPM | HEART RATE: 73 BPM | HEIGHT: 60 IN

## 2024-07-23 DIAGNOSIS — Z91.018 ALLERGY TO OTHER FOODS: ICD-10-CM

## 2024-07-23 DIAGNOSIS — R19.7 DIARRHEA, UNSPECIFIED: ICD-10-CM

## 2024-07-23 DIAGNOSIS — Z53.29 PROCEDURE AND TREATMENT NOT CARRIED OUT BECAUSE OF PATIENT'S DECISION FOR OTHER REASONS: ICD-10-CM

## 2024-07-23 DIAGNOSIS — R10.32 LEFT LOWER QUADRANT PAIN: ICD-10-CM

## 2024-07-23 DIAGNOSIS — F17.200 NICOTINE DEPENDENCE, UNSPECIFIED, UNCOMPLICATED: ICD-10-CM

## 2024-07-23 DIAGNOSIS — Z98.89 UNDEFINED: Chronic | ICD-10-CM

## 2024-07-23 DIAGNOSIS — R11.0 NAUSEA: ICD-10-CM

## 2024-07-23 DIAGNOSIS — Z98.891 HISTORY OF UTERINE SCAR FROM PREVIOUS SURGERY: ICD-10-CM

## 2024-07-23 DIAGNOSIS — Z87.42 PERSONAL HISTORY OF OTHER DISEASES OF THE FEMALE GENITAL TRACT: ICD-10-CM

## 2024-07-23 DIAGNOSIS — Z91.013 ALLERGY TO SEAFOOD: ICD-10-CM

## 2024-07-23 LAB
ALBUMIN SERPL ELPH-MCNC: 3.6 G/DL — SIGNIFICANT CHANGE UP (ref 3.3–5)
ALP SERPL-CCNC: 57 U/L — SIGNIFICANT CHANGE UP (ref 40–120)
ALT FLD-CCNC: 24 U/L — SIGNIFICANT CHANGE UP (ref 12–78)
ANION GAP SERPL CALC-SCNC: 5 MMOL/L — SIGNIFICANT CHANGE UP (ref 5–17)
APPEARANCE UR: CLEAR — SIGNIFICANT CHANGE UP
AST SERPL-CCNC: 21 U/L — SIGNIFICANT CHANGE UP (ref 15–37)
BACTERIA # UR AUTO: ABNORMAL /HPF
BASOPHILS # BLD AUTO: 0.02 K/UL — SIGNIFICANT CHANGE UP (ref 0–0.2)
BASOPHILS NFR BLD AUTO: 0.2 % — SIGNIFICANT CHANGE UP (ref 0–2)
BILIRUB SERPL-MCNC: 0.3 MG/DL — SIGNIFICANT CHANGE UP (ref 0.2–1.2)
BILIRUB UR-MCNC: NEGATIVE — SIGNIFICANT CHANGE UP
BUN SERPL-MCNC: 11 MG/DL — SIGNIFICANT CHANGE UP (ref 7–23)
CALCIUM SERPL-MCNC: 9.2 MG/DL — SIGNIFICANT CHANGE UP (ref 8.5–10.1)
CHLORIDE SERPL-SCNC: 107 MMOL/L — SIGNIFICANT CHANGE UP (ref 96–108)
CO2 SERPL-SCNC: 25 MMOL/L — SIGNIFICANT CHANGE UP (ref 22–31)
COLOR SPEC: YELLOW — SIGNIFICANT CHANGE UP
CREAT SERPL-MCNC: 0.62 MG/DL — SIGNIFICANT CHANGE UP (ref 0.5–1.3)
DIFF PNL FLD: NEGATIVE — SIGNIFICANT CHANGE UP
EGFR: 124 ML/MIN/1.73M2 — SIGNIFICANT CHANGE UP
EOSINOPHIL # BLD AUTO: 0.38 K/UL — SIGNIFICANT CHANGE UP (ref 0–0.5)
EOSINOPHIL NFR BLD AUTO: 4.5 % — SIGNIFICANT CHANGE UP (ref 0–6)
EPI CELLS # UR: PRESENT
GLUCOSE SERPL-MCNC: 80 MG/DL — SIGNIFICANT CHANGE UP (ref 70–99)
GLUCOSE UR QL: NEGATIVE MG/DL — SIGNIFICANT CHANGE UP
HCG SERPL-ACNC: <1 MIU/ML — SIGNIFICANT CHANGE UP
HCT VFR BLD CALC: 38.1 % — SIGNIFICANT CHANGE UP (ref 34.5–45)
HGB BLD-MCNC: 12.8 G/DL — SIGNIFICANT CHANGE UP (ref 11.5–15.5)
HIV 1 & 2 AB SERPL IA.RAPID: SIGNIFICANT CHANGE UP
IMM GRANULOCYTES NFR BLD AUTO: 0.1 % — SIGNIFICANT CHANGE UP (ref 0–0.9)
KETONES UR-MCNC: NEGATIVE MG/DL — SIGNIFICANT CHANGE UP
LACTATE SERPL-SCNC: 0.6 MMOL/L — LOW (ref 0.7–2)
LEUKOCYTE ESTERASE UR-ACNC: ABNORMAL
LYMPHOCYTES # BLD AUTO: 2.74 K/UL — SIGNIFICANT CHANGE UP (ref 1–3.3)
LYMPHOCYTES # BLD AUTO: 32.5 % — SIGNIFICANT CHANGE UP (ref 13–44)
MCHC RBC-ENTMCNC: 32.2 PG — SIGNIFICANT CHANGE UP (ref 27–34)
MCHC RBC-ENTMCNC: 33.6 G/DL — SIGNIFICANT CHANGE UP (ref 32–36)
MCV RBC AUTO: 95.7 FL — SIGNIFICANT CHANGE UP (ref 80–100)
MONOCYTES # BLD AUTO: 0.65 K/UL — SIGNIFICANT CHANGE UP (ref 0–0.9)
MONOCYTES NFR BLD AUTO: 7.7 % — SIGNIFICANT CHANGE UP (ref 2–14)
NEUTROPHILS # BLD AUTO: 4.64 K/UL — SIGNIFICANT CHANGE UP (ref 1.8–7.4)
NEUTROPHILS NFR BLD AUTO: 55 % — SIGNIFICANT CHANGE UP (ref 43–77)
NITRITE UR-MCNC: NEGATIVE — SIGNIFICANT CHANGE UP
NRBC # BLD: 0 /100 WBCS — SIGNIFICANT CHANGE UP (ref 0–0)
PH UR: 6 — SIGNIFICANT CHANGE UP (ref 5–8)
PLATELET # BLD AUTO: 257 K/UL — SIGNIFICANT CHANGE UP (ref 150–400)
POTASSIUM SERPL-MCNC: 3.8 MMOL/L — SIGNIFICANT CHANGE UP (ref 3.5–5.3)
POTASSIUM SERPL-SCNC: 3.8 MMOL/L — SIGNIFICANT CHANGE UP (ref 3.5–5.3)
PROT SERPL-MCNC: 7.3 GM/DL — SIGNIFICANT CHANGE UP (ref 6–8.3)
PROT UR-MCNC: NEGATIVE MG/DL — SIGNIFICANT CHANGE UP
RBC # BLD: 3.98 M/UL — SIGNIFICANT CHANGE UP (ref 3.8–5.2)
RBC # FLD: 11.7 % — SIGNIFICANT CHANGE UP (ref 10.3–14.5)
RBC CASTS # UR COMP ASSIST: 2 /HPF — SIGNIFICANT CHANGE UP (ref 0–4)
SODIUM SERPL-SCNC: 137 MMOL/L — SIGNIFICANT CHANGE UP (ref 135–145)
SP GR SPEC: 1.03 — SIGNIFICANT CHANGE UP (ref 1–1.03)
UROBILINOGEN FLD QL: 0.2 MG/DL — SIGNIFICANT CHANGE UP (ref 0.2–1)
WBC # BLD: 8.44 K/UL — SIGNIFICANT CHANGE UP (ref 3.8–10.5)
WBC # FLD AUTO: 8.44 K/UL — SIGNIFICANT CHANGE UP (ref 3.8–10.5)
WBC UR QL: 4 /HPF — SIGNIFICANT CHANGE UP (ref 0–5)

## 2024-07-23 PROCEDURE — 74177 CT ABD & PELVIS W/CONTRAST: CPT | Mod: 26,MC

## 2024-07-23 PROCEDURE — 99285 EMERGENCY DEPT VISIT HI MDM: CPT

## 2024-07-23 RX ORDER — KETOROLAC TROMETHAMINE 30 MG/ML
30 INJECTION, SOLUTION INTRAMUSCULAR ONCE
Refills: 0 | Status: COMPLETED | OUTPATIENT
Start: 2024-07-23 | End: 2024-07-23

## 2024-07-23 RX ORDER — ACETAMINOPHEN 325 MG
1000 TABLET ORAL ONCE
Refills: 0 | Status: COMPLETED | OUTPATIENT
Start: 2024-07-23 | End: 2024-07-23

## 2024-07-23 RX ORDER — SODIUM CHLORIDE 0.9 % (FLUSH) 0.9 %
1000 SYRINGE (ML) INJECTION ONCE
Refills: 0 | Status: COMPLETED | OUTPATIENT
Start: 2024-07-23 | End: 2024-07-23

## 2024-07-23 RX ORDER — ONDANSETRON HYDROCHLORIDE 2 MG/ML
4 INJECTION INTRAMUSCULAR; INTRAVENOUS ONCE
Refills: 0 | Status: COMPLETED | OUTPATIENT
Start: 2024-07-23 | End: 2024-07-23

## 2024-07-23 RX ADMIN — ONDANSETRON HYDROCHLORIDE 4 MILLIGRAM(S): 2 INJECTION INTRAMUSCULAR; INTRAVENOUS at 19:21

## 2024-07-23 RX ADMIN — Medication 1000 MILLILITER(S): at 19:23

## 2024-07-23 RX ADMIN — Medication 400 MILLIGRAM(S): at 19:21

## 2024-07-23 NOTE — ED PROVIDER NOTE - PHYSICAL EXAMINATION
Gen: aox3, nad,   Head: NCAT  ENT: Airway patent, moist mucous membranes, nasal passageways clear,   Cardiac: Normal rate, normal rhythm   Respiratory: Lungs CTA B/L  Gastrointestinal: Abdomen soft, mod b/l lower abd tenderness RLQ > LLQ without rebound/ guarding   MSK: No gross abnormalities, FROM of all four extremities, no edema  HEME: Extremities warm, pulses intact and symmetrical in all four extremities  Skin: No rashes, no lesions  Neuro: No gross neurologic deficits

## 2024-07-23 NOTE — ED ADULT NURSE NOTE - PRIMARY CARE PROVIDER
CC: sinus infection  HPI: Windy has had symptoms for about 9 days, symptoms have been getting worse over the past few days. Sinus pressure, congestion, crusted eyes. Headache, frontal. No sore throat now, cough past couple of days is productvie of yellow phlegm. No shortness of breath. Chills, generalized myalgias.   Dayquil, Nyquil, theraflu.     I have personally reviewed and updated the following Epic sections: Current medications, Allergies, Problem List, Past Medical History and Social History.    EXAM:  Blood pressure 104/68, pulse 114, temperature 98.4 °F (36.9 °C), temperature source Tympanic, resp. rate 18, weight 52.2 kg, SpO2 99 %.  GENERAL: Well-groomed, well-nourished.  Alert, oriented, no acute distress.  HEENT:  Conjunctivae pink, sclerae clear; TMs (tympanic membranes) pearly gray bilaterally; turbinates pink with mucosal erythema and bogginess, frontal sinus tenderness; oral mucosa moist without lesions.  Posterior oropharynx without erythema or exudate.  Neck supple without lymphadenopathy or mass.  PULMONARY:  Breathing is non-labored at rest.  LUNGS:  Clear to auscultation with good aeration throughout.  CARDIAC: RRR (regular rate and rhythm), S1, S2, no murmur, no gallop. No carotid bruit bilaterally.     ASSESSMENT/PLAN:  Sinusitis. Omnicef 300 mg twice daily for 7 days. Continue over-the-counter medications for symptomatically relief. See Dr. Harvey if no improvement over the next few days or if symptoms are worse.  
Windy Saenz is a 20 year old female presenting with her mother for sinus pressure, congestion, and eye crustiness x 9 days. Pt has tried Theraflu and daquil with mild relief.    PCP dr. Macario  Denies known Latex allergy or symptoms of Latex sensitivity.  Medications reviewed and updated.    
unknown

## 2024-07-23 NOTE — ED PROVIDER NOTE - PATIENT PORTAL LINK FT
You can access the FollowMyHealth Patient Portal offered by [facility] by registering at the following website: [email or web address]. By joining [employer]’s FollowMyHealth portal, you will also be able to view your health information using other applications (apps) compatible with our system.

## 2024-07-23 NOTE — ED PROVIDER NOTE - PROGRESS NOTE DETAILS
Gomez DO: pain improved, pt feels better, called US to expedite study, discussed w/ Doc mcclure who stated pt was next and in transport. Pt was informed. Pt then told RN that she was not willing to wait and requested to leave against medical advice. She did not wait for re-eval and discussion, did not wait for AMA paperwork. She understood that US was to rule out torsion or decreased blood flow to ovary and why we felt it was important study

## 2024-07-23 NOTE — ED ADULT NURSE NOTE - OBJECTIVE STATEMENT
pt presents to the ed c/o lower bad pain a/w nausea no vomiting, diarrhea, constipation. denies fever, chills, headache, dizziness, dysuria

## 2024-07-23 NOTE — ED PROVIDER NOTE - CLINICAL SUMMARY MEDICAL DECISION MAKING FREE TEXT BOX
29F pmhx fibroids, prior c/s 8 years ago, who presents for evaluation of b/l lower abd cramping pain, states 'over her  scar' initially intermittent ongoing for some time but over last 24 hours has been severe and persistent, worse in the right lower abd. Denies fever, flank pain, vomiting, dysuria. Notes nausea. Notes occasional loose stool. Took Tylenol in the morning without relief.  + TTP RLQ, without rebound/guarding, rule out ovarian torsion, rule out appendicitis, labs to rule out pregnancy or metabolic derangements, possible pain related to fibroids, reassess 29F pmhx fibroids, prior c/s 8 years ago, who presents for evaluation of b/l lower abd cramping pain, states 'over her  scar' initially intermittent ongoing for some time but over last 24 hours has been severe and persistent, worse in the right lower abd. Denies fever, flank pain, vomiting, dysuria. Notes nausea. Notes occasional loose stool. Took Tylenol in the morning without relief.  + TTP RLQ, without rebound/guarding, rule out ovarian torsion, rule out appendicitis, labs to rule out pregnancy or metabolic derangements, possible pain related to fibroids, reassess  - Patient clinically improved after meds, CT without emergent findings but due to cyst, TVUS to rule out torsion ordered, d/w US but unable to expedite study further, patient spoke w/ RN that she would like to leave against medical advice and not wait for paperwork, she requested RN remove IV. Patient then walked out without allowing me to speak to her. RN did discuss with patient what leaving against medical advice entails. Pt had been reassessed and informed of US time delay and that I have reached out to tech    The patient has decided to leave against medical advice.  The patient is AAOx3, not intoxicated, and displays normal decision making ability. We discussed all risks, benefits, and alternatives to the progression of treatment and the potential dangers of leaving including but not limited to permanent disability, injury, and death.  The patient was instructed that they are welcome to change their decision to leave against medical advice and return to the emergency department at any time and for any reason in order to allow us to render care.    The patient left prior to signing AMA paperwork

## 2024-07-23 NOTE — ED PROVIDER NOTE - NSFOLLOWUPCLINICS_GEN_ALL_ED_FT
[facility]  OB/GYN & Surg  [address] [place] [id number]  Phone: [phone]  Fax:     [facility] Gynecology and Obstetrics  Gynceology/OB  [address] [place] [address]  Phone: [phone]  Fax:

## 2024-07-23 NOTE — ED ADULT NURSE NOTE - NSFALLUNIVINTERV_ED_ALL_ED
Bed/Stretcher in lowest position, wheels locked, appropriate side rails in place/Call bell, personal items and telephone in reach/Instruct patient to call for assistance before getting out of bed/chair/stretcher/Non-slip footwear applied when patient is off stretcher/[place] to call system/Physically safe environment - no spills, clutter or unnecessary equipment/Purposeful proactive rounding/Room/bathroom lighting operational, light cord in reach

## 2024-07-23 NOTE — ED ADULT TRIAGE NOTE - CHIEF COMPLAINT QUOTE
Pt c/o pelvic pain, with on/off spotting x 2 weeks, and R rib pain x 1 year, pt stated pain intensify for the past month

## 2024-07-23 NOTE — ED PROVIDER NOTE - OBJECTIVE STATEMENT
29F pmhx fibroids, prior c/s 8 years ago, who presents for evaluation of b/l lower abd cramping pain, states 'over her  scar' initially intermittent ongoing for some time but over last 24 hours has been severe and persistent, worse in the right lower abd. Denies fever, flank pain, vomiting, dysuria. Notes nausea. Notes occasional loose stool. Took Tylenol in the morning without relief.

## 2024-07-24 ENCOUNTER — EMERGENCY (EMERGENCY)
Facility: HOSPITAL | Age: 29
LOS: 1 days | Discharge: HOME | End: 2024-07-24
Attending: STUDENT IN AN ORGANIZED HEALTH CARE EDUCATION/TRAINING PROGRAM | Admitting: STUDENT IN AN ORGANIZED HEALTH CARE EDUCATION/TRAINING PROGRAM
Payer: MEDICAID

## 2024-07-24 VITALS
OXYGEN SATURATION: 100 % | DIASTOLIC BLOOD PRESSURE: 64 MMHG | HEART RATE: 58 BPM | RESPIRATION RATE: 16 BRPM | TEMPERATURE: 98 F | SYSTOLIC BLOOD PRESSURE: 100 MMHG

## 2024-07-24 VITALS
HEIGHT: 60 IN | SYSTOLIC BLOOD PRESSURE: 98 MMHG | DIASTOLIC BLOOD PRESSURE: 61 MMHG | WEIGHT: 139.99 LBS | OXYGEN SATURATION: 100 % | RESPIRATION RATE: 16 BRPM | HEART RATE: 74 BPM | TEMPERATURE: 98 F

## 2024-07-24 DIAGNOSIS — Z98.89 OTHER SPECIFIED POSTPROCEDURAL STATES: Chronic | ICD-10-CM

## 2024-07-24 LAB
ADD ON TEST-SPECIMEN IN LAB: SIGNIFICANT CHANGE UP
ALBUMIN SERPL ELPH-MCNC: 4.2 G/DL — SIGNIFICANT CHANGE UP (ref 3.3–5)
ALP SERPL-CCNC: 61 U/L — SIGNIFICANT CHANGE UP (ref 40–120)
ALT FLD-CCNC: 22 U/L — SIGNIFICANT CHANGE UP (ref 4–33)
ANION GAP SERPL CALC-SCNC: 11 MMOL/L — SIGNIFICANT CHANGE UP (ref 7–14)
APPEARANCE UR: ABNORMAL
AST SERPL-CCNC: 23 U/L — SIGNIFICANT CHANGE UP (ref 4–32)
BACTERIA # UR AUTO: ABNORMAL /HPF
BASE EXCESS BLDV CALC-SCNC: -3.1 MMOL/L — LOW (ref -2–3)
BASOPHILS # BLD AUTO: 0.03 K/UL — SIGNIFICANT CHANGE UP (ref 0–0.2)
BASOPHILS NFR BLD AUTO: 0.4 % — SIGNIFICANT CHANGE UP (ref 0–2)
BILIRUB SERPL-MCNC: 0.2 MG/DL — SIGNIFICANT CHANGE UP (ref 0.2–1.2)
BILIRUB UR-MCNC: NEGATIVE — SIGNIFICANT CHANGE UP
BLOOD GAS VENOUS COMPREHENSIVE RESULT: SIGNIFICANT CHANGE UP
BUN SERPL-MCNC: 10 MG/DL — SIGNIFICANT CHANGE UP (ref 7–23)
CALCIUM SERPL-MCNC: 9 MG/DL — SIGNIFICANT CHANGE UP (ref 8.4–10.5)
CAST: 2 /LPF — SIGNIFICANT CHANGE UP (ref 0–4)
CHLORIDE BLDV-SCNC: 105 MMOL/L — SIGNIFICANT CHANGE UP (ref 96–108)
CHLORIDE SERPL-SCNC: 105 MMOL/L — SIGNIFICANT CHANGE UP (ref 98–107)
CO2 BLDV-SCNC: 26.7 MMOL/L — HIGH (ref 22–26)
CO2 SERPL-SCNC: 23 MMOL/L — SIGNIFICANT CHANGE UP (ref 22–31)
COLOR SPEC: YELLOW — SIGNIFICANT CHANGE UP
CREAT SERPL-MCNC: 0.59 MG/DL — SIGNIFICANT CHANGE UP (ref 0.5–1.3)
DIFF PNL FLD: NEGATIVE — SIGNIFICANT CHANGE UP
EGFR: 126 ML/MIN/1.73M2 — SIGNIFICANT CHANGE UP
EGFR: 126 ML/MIN/1.73M2 — SIGNIFICANT CHANGE UP
EOSINOPHIL # BLD AUTO: 0.35 K/UL — SIGNIFICANT CHANGE UP (ref 0–0.5)
EOSINOPHIL NFR BLD AUTO: 4.7 % — SIGNIFICANT CHANGE UP (ref 0–6)
GAS PNL BLDV: 135 MMOL/L — LOW (ref 136–145)
GAS PNL BLDV: SIGNIFICANT CHANGE UP
GLUCOSE BLDV-MCNC: 60 MG/DL — LOW (ref 70–99)
GLUCOSE SERPL-MCNC: 61 MG/DL — LOW (ref 70–99)
GLUCOSE UR QL: NEGATIVE MG/DL — SIGNIFICANT CHANGE UP
HCO3 BLDV-SCNC: 25 MMOL/L — SIGNIFICANT CHANGE UP (ref 22–29)
HCT VFR BLD CALC: 37.2 % — SIGNIFICANT CHANGE UP (ref 34.5–45)
HCT VFR BLDA CALC: 41 % — SIGNIFICANT CHANGE UP (ref 34.5–46.5)
HCV AB S/CO SERPL IA: 0.12 S/CO — SIGNIFICANT CHANGE UP (ref 0–0.99)
HCV AB SERPL-IMP: SIGNIFICANT CHANGE UP
HGB BLD CALC-MCNC: 13.6 G/DL — SIGNIFICANT CHANGE UP (ref 11.7–16.1)
HGB BLD-MCNC: 12.9 G/DL — SIGNIFICANT CHANGE UP (ref 11.5–15.5)
IANC: 4.03 K/UL — SIGNIFICANT CHANGE UP (ref 1.8–7.4)
IMM GRANULOCYTES NFR BLD AUTO: 0.1 % — SIGNIFICANT CHANGE UP (ref 0–0.9)
KETONES UR-MCNC: ABNORMAL MG/DL
LACTATE BLDV-MCNC: 1.7 MMOL/L — SIGNIFICANT CHANGE UP (ref 0.5–2)
LEUKOCYTE ESTERASE UR-ACNC: ABNORMAL
LIDOCAIN IGE QN: 35 U/L — SIGNIFICANT CHANGE UP (ref 7–60)
LYMPHOCYTES # BLD AUTO: 2.22 K/UL — SIGNIFICANT CHANGE UP (ref 1–3.3)
LYMPHOCYTES # BLD AUTO: 30 % — SIGNIFICANT CHANGE UP (ref 13–44)
MCHC RBC-ENTMCNC: 33.2 PG — SIGNIFICANT CHANGE UP (ref 27–34)
MCHC RBC-ENTMCNC: 34.7 GM/DL — SIGNIFICANT CHANGE UP (ref 32–36)
MCV RBC AUTO: 95.9 FL — SIGNIFICANT CHANGE UP (ref 80–100)
MONOCYTES # BLD AUTO: 0.77 K/UL — SIGNIFICANT CHANGE UP (ref 0–0.9)
MONOCYTES NFR BLD AUTO: 10.4 % — SIGNIFICANT CHANGE UP (ref 2–14)
NEUTROPHILS # BLD AUTO: 4.03 K/UL — SIGNIFICANT CHANGE UP (ref 1.8–7.4)
NEUTROPHILS NFR BLD AUTO: 54.4 % — SIGNIFICANT CHANGE UP (ref 43–77)
NITRITE UR-MCNC: POSITIVE
NRBC # BLD AUTO: 0 K/UL — SIGNIFICANT CHANGE UP (ref 0–0)
NRBC # BLD: 0 /100 WBCS — SIGNIFICANT CHANGE UP (ref 0–0)
NRBC # FLD: 0 K/UL — SIGNIFICANT CHANGE UP (ref 0–0)
NRBC BLD-RTO: 0 /100 WBCS — SIGNIFICANT CHANGE UP (ref 0–0)
PCO2 BLDV: 57 MMHG — HIGH (ref 39–52)
PH BLDV: 7.25 — LOW (ref 7.32–7.43)
PH UR: 5.5 — SIGNIFICANT CHANGE UP (ref 5–8)
PLATELET # BLD AUTO: 268 K/UL — SIGNIFICANT CHANGE UP (ref 150–400)
PO2 BLDV: 24 MMHG — LOW (ref 25–45)
POTASSIUM BLDV-SCNC: 3.9 MMOL/L — SIGNIFICANT CHANGE UP (ref 3.5–5.1)
POTASSIUM SERPL-MCNC: 3.8 MMOL/L — SIGNIFICANT CHANGE UP (ref 3.5–5.3)
POTASSIUM SERPL-SCNC: 3.8 MMOL/L — SIGNIFICANT CHANGE UP (ref 3.5–5.3)
PROT SERPL-MCNC: 7.1 G/DL — SIGNIFICANT CHANGE UP (ref 6–8.3)
PROT UR-MCNC: SIGNIFICANT CHANGE UP MG/DL
RBC # BLD: 3.88 M/UL — SIGNIFICANT CHANGE UP (ref 3.8–5.2)
RBC # FLD: 11.5 % — SIGNIFICANT CHANGE UP (ref 10.3–14.5)
RBC CASTS # UR COMP ASSIST: 1 /HPF — SIGNIFICANT CHANGE UP (ref 0–4)
REVIEW: SIGNIFICANT CHANGE UP
SAO2 % BLDV: 34 % — LOW (ref 67–88)
SODIUM SERPL-SCNC: 139 MMOL/L — SIGNIFICANT CHANGE UP (ref 135–145)
SP GR SPEC: 1.04 — HIGH (ref 1–1.03)
SQUAMOUS # UR AUTO: 5 /HPF — SIGNIFICANT CHANGE UP (ref 0–5)
UROBILINOGEN FLD QL: 1 MG/DL — SIGNIFICANT CHANGE UP (ref 0.2–1)
WBC # BLD: 7.41 K/UL — SIGNIFICANT CHANGE UP (ref 3.8–10.5)
WBC # FLD AUTO: 7.41 K/UL — SIGNIFICANT CHANGE UP (ref 3.8–10.5)
WBC UR QL: 23 /HPF — HIGH (ref 0–5)

## 2024-07-24 PROCEDURE — 76830 TRANSVAGINAL US NON-OB: CPT | Mod: 26

## 2024-07-24 PROCEDURE — 99284 EMERGENCY DEPT VISIT MOD MDM: CPT

## 2024-07-24 RX ORDER — KETOROLAC TROMETHAMINE 30 MG/ML
15 INJECTION, SOLUTION INTRAMUSCULAR; INTRAVENOUS ONCE
Refills: 0 | Status: DISCONTINUED | OUTPATIENT
Start: 2024-07-24 | End: 2024-07-24

## 2024-07-24 RX ORDER — CEFUROXIME SODIUM 1.5 G
1 VIAL (EA) INJECTION
Qty: 14 | Refills: 0
Start: 2024-07-24 | End: 2024-07-30

## 2024-07-24 RX ADMIN — Medication 1000 MILLILITER(S): at 15:51

## 2024-07-24 RX ADMIN — KETOROLAC TROMETHAMINE 15 MILLIGRAM(S): 30 INJECTION, SOLUTION INTRAMUSCULAR; INTRAVENOUS at 15:51

## 2024-07-28 LAB
-  AMOXICILLIN/CLAVULANIC ACID: SIGNIFICANT CHANGE UP
-  AMPICILLIN/SULBACTAM: SIGNIFICANT CHANGE UP
-  AMPICILLIN: SIGNIFICANT CHANGE UP
-  AZTREONAM: SIGNIFICANT CHANGE UP
-  CEFAZOLIN: SIGNIFICANT CHANGE UP
-  CEFEPIME: SIGNIFICANT CHANGE UP
-  CEFOXITIN: SIGNIFICANT CHANGE UP
-  CEFTRIAXONE: SIGNIFICANT CHANGE UP
-  CEFUROXIME: SIGNIFICANT CHANGE UP
-  CIPROFLOXACIN: SIGNIFICANT CHANGE UP
-  ERTAPENEM: SIGNIFICANT CHANGE UP
-  GENTAMICIN: SIGNIFICANT CHANGE UP
-  IMIPENEM: SIGNIFICANT CHANGE UP
-  LEVOFLOXACIN: SIGNIFICANT CHANGE UP
-  MEROPENEM: SIGNIFICANT CHANGE UP
-  NITROFURANTOIN: SIGNIFICANT CHANGE UP
-  PIPERACILLIN/TAZOBACTAM: SIGNIFICANT CHANGE UP
-  TOBRAMYCIN: SIGNIFICANT CHANGE UP
-  TRIMETHOPRIM/SULFAMETHOXAZOLE: SIGNIFICANT CHANGE UP
CULTURE RESULTS: ABNORMAL
METHOD TYPE: SIGNIFICANT CHANGE UP
ORGANISM # SPEC MICROSCOPIC CNT: ABNORMAL
ORGANISM # SPEC MICROSCOPIC CNT: ABNORMAL
SPECIMEN SOURCE: SIGNIFICANT CHANGE UP

## 2024-10-11 NOTE — CONSULT NOTE ADULT - PROBLEM SELECTOR RECOMMENDATION 9
Last office visit date: 6-21-24  Next appointment scheduled?: No;  follow up in 3 months    Number of refills given: 0    Medication:    Name from pharmacy: TORSEMIDE 20 MG TABLET         Will file in chart as: torsemide (DEMADEX) 20 MG tablet    Sig: Take 1 tablet by mouth daily.    Original sig: TAKE 1 TABLET BY MOUTH DAILY    Disp: 30 tablet    Refills: 1 (Pharmacy requested: Not specified)    Start: 10/10/2024    Class: Eprescribe    Non-formulary    Last ordered: 3 months ago (6/21/2024) by Justen Perez DO    Last refill: 9/3/2024    Rx #: [id number]    Diuretics Refill Protocol - 12 Month Protocol Tzuziz14/10/2024 02:43 PM   Protocol Details eGFR resulted within last 12 months -- IF CRITERIA FAILED REFER TO PROTOCOL DETAILS    Seen by prescribing provider or same department within the last 12 months or has a future appt in 3 months - IF FAILED PLEASE LOOK AT CHART REVIEW FOR LAST VISIT AND PROCEED ACCORDINGLY    Normal Potassium within last 12 months looking at last value -- IF CRITERIA FAILED REFER TO PROTOCOL DETAILS    Normal Sodium within last 12 months looking at last value -- IF CRITERIA FAILED REFER TO PROTOCOL DETAILS    eGFR greater than 29 within last 12 months looking at last value    Medication (including dose and sig) on current meds list    Last BP was equal to or less than 150/100 -- IF CRITERIA FAILED REFER TO PROTOCOL DETAILS      To be filled at: [facility] [id number] - [address]    passed protocol.        NPO, IV fluid, Peptobismo, IV PPI, Zofran PRN, f/u labs --- EGD if no improvement.

## 2025-02-25 NOTE — DISCHARGE NOTE ADULT - HAS THE PATIENT USED TOBACCO IN THE PAST 30 DAYS?
pt medicated for pain VS stable seen by podiatry, right foot placed in soft cast f/u within 1 week lac repaired by MD Bobo
Yes

## 2025-06-05 ENCOUNTER — EMERGENCY (EMERGENCY)
Facility: HOSPITAL | Age: 30
LOS: 1 days | End: 2025-06-05
Attending: EMERGENCY MEDICINE | Admitting: EMERGENCY MEDICINE
Payer: MEDICAID

## 2025-06-05 VITALS
HEART RATE: 96 BPM | TEMPERATURE: 98 F | OXYGEN SATURATION: 100 % | DIASTOLIC BLOOD PRESSURE: 67 MMHG | SYSTOLIC BLOOD PRESSURE: 107 MMHG | RESPIRATION RATE: 18 BRPM

## 2025-06-05 DIAGNOSIS — Z98.89 OTHER SPECIFIED POSTPROCEDURAL STATES: Chronic | ICD-10-CM

## 2025-06-05 PROCEDURE — 99284 EMERGENCY DEPT VISIT MOD MDM: CPT

## 2025-06-05 NOTE — ED ADULT TRIAGE NOTE - CHIEF COMPLAINT QUOTE
Pt arrives from home via EMS. Pt has a hx of asthma which began this afternoon and discovered her albuterol MDI was empty and that the asthma began progressively worsening accompanied by wheezing so she called 911. Per EMS, pt was given Duoned x2, 250cc of NS and 10mg. of Dexamethasone IVP prior to ER arrival. Pt arrives with 22G IV in L hand and reports in triage that breathing is "much better" and is now only c/o of "chest soreness"

## 2025-06-06 LAB
FLUAV AG NPH QL: SIGNIFICANT CHANGE UP
FLUBV AG NPH QL: SIGNIFICANT CHANGE UP
RSV RNA NPH QL NAA+NON-PROBE: SIGNIFICANT CHANGE UP
SARS-COV-2 RNA SPEC QL NAA+PROBE: SIGNIFICANT CHANGE UP
SOURCE RESPIRATORY: SIGNIFICANT CHANGE UP

## 2025-06-06 PROCEDURE — 71046 X-RAY EXAM CHEST 2 VIEWS: CPT | Mod: 26

## 2025-06-06 PROCEDURE — 93010 ELECTROCARDIOGRAM REPORT: CPT | Mod: 76

## 2025-06-06 RX ORDER — PREDNISONE 20 MG/1
1 TABLET ORAL
Qty: 5 | Refills: 0
Start: 2025-06-06 | End: 2025-06-10

## 2025-06-06 RX ORDER — KETOROLAC TROMETHAMINE 30 MG/ML
15 INJECTION, SOLUTION INTRAMUSCULAR; INTRAVENOUS ONCE
Refills: 0 | Status: DISCONTINUED | OUTPATIENT
Start: 2025-06-06 | End: 2025-06-06

## 2025-06-06 RX ORDER — IPRATROPIUM BROMIDE AND ALBUTEROL SULFATE .5; 2.5 MG/3ML; MG/3ML
3 SOLUTION RESPIRATORY (INHALATION) ONCE
Refills: 0 | Status: COMPLETED | OUTPATIENT
Start: 2025-06-06 | End: 2025-06-06

## 2025-06-06 RX ORDER — ALBUTEROL SULFATE 2.5 MG/3ML
2 VIAL, NEBULIZER (ML) INHALATION
Qty: 1 | Refills: 0
Start: 2025-06-06

## 2025-06-06 RX ORDER — ALBUTEROL SULFATE 2.5 MG/3ML
1 VIAL, NEBULIZER (ML) INHALATION ONCE
Refills: 0 | Status: COMPLETED | OUTPATIENT
Start: 2025-06-06 | End: 2025-06-06

## 2025-06-06 RX ADMIN — KETOROLAC TROMETHAMINE 15 MILLIGRAM(S): 30 INJECTION, SOLUTION INTRAMUSCULAR; INTRAVENOUS at 00:46

## 2025-06-06 RX ADMIN — Medication 1 PUFF(S): at 02:34

## 2025-06-06 RX ADMIN — Medication 20 MILLIGRAM(S): at 00:46

## 2025-06-06 RX ADMIN — IPRATROPIUM BROMIDE AND ALBUTEROL SULFATE 3 MILLILITER(S): .5; 2.5 SOLUTION RESPIRATORY (INHALATION) at 00:43

## 2025-06-06 NOTE — ED ADULT NURSE REASSESSMENT NOTE - NS ED NURSE REASSESS COMMENT FT1
Break RN: Received pt in stable condition, in no acute distress, resting comfortably on stretcher. Resp. equal and unlabored, no wheezes or crackles, not tachypneic/tachycardic. Safety precautions maintained. Pending dispo.

## 2025-06-06 NOTE — ED PROVIDER NOTE - ATTENDING CONTRIBUTION TO CARE
29 y/o F with h/o mild intermittent asthma p/w sob.  Pt denies any known triggers, allergens, recent illness, fevers.  States she was stressed out today.  Pt received nebs and steroids PTA by EMS with improvement in sxs.  Pt states she ran out of her inhaler at home.  No tob use.  No recent travel or ocp use.  No cp, abd pain, leg pain or swelling.    Well appearing, lying comfortably in stretcher, awake and alert, nontoxic.  AF/VSS.  Speaking in full sentences.  Lungs cta bl, no wheezing, no hypoxia, no resp distress.  Cards nl S1/S2, RRR, no MRG.  Abd soft ntnd.  No pedal edema or calf tenderness.    Likely asthma exacerbation, improvement with nebs.  Will check ekg, cxr, rvp, home with albuterol mdi and steroids.
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
## 2025-06-06 NOTE — ED ADULT NURSE NOTE - OBJECTIVE STATEMENT
Pt received to room 18. Pt is a 30 year old female with Hx of asthma, Pt presented to ED c/o asthma exacerbation. States she ran out of her MDI, experienced worsening asthma symptoms today and associated chest tightness. Pt called EMS, received 2 Duonebs, 250mL bolus en route. States symptoms improved, but still feels chest tightness. 22G IV noted to L hand +blood return and flushes without difficulty. Denies SOB, headache, dizziness, abdominal pain, n/v/d, urinary symptoms, fevers/chills, numbness/tingling.   Pt is A&Ox4, ambulatory without assistance. neuro/sensory intact. airway patent, speaking clearly in full sentences. breathing is even and unlabored. abdomen is soft, nontender, nondistended. no edema noted. skin is intact. spontaneous movement of all extremities. Placed on continuous pulse oximetry. EKG in chart. VS as noted in flowsheet. Medications administered as ordered. awaiting imaging. comfort measures provided. stretcher set in lowest position, call bell within reach, safety maintained. no acute distress noted.

## 2025-06-06 NOTE — ED PROVIDER NOTE - PATIENT PORTAL LINK FT
You can access the FollowMyHealth Patient Portal offered by [facility] by registering at the following website: [email or web address]. By joining [employer]’s FollowMyHealth portal, you will also be able to view your health information using other applications (apps) compatible with our system.

## 2025-06-06 NOTE — ED PROVIDER NOTE - NSFOLLOWUPINSTRUCTIONS_ED_ALL_ED_FT
You are being discharged following your visit for asthma symptoms that improved after nebulizer treatment. Please read the following instructions carefully to manage your condition effectively at home.    Medication and Treatment:    Continue to use your nebulizer machine at home as instructed during your visit.  You may also use your albuterol inhaler with the spacer as needed for relief of symptoms. Ensure you are using the inhaler correctly; if unsure, refer to the instruction leaflet or consult a healthcare professional.  Take all medications as prescribed by your doctor, and do not alter your treatment regimen without consulting them first.  Follow-up Care:    Schedule an appointment with your primary care physician this week for a follow-up. It's crucial to assess your current treatment plan and make any necessary adjustments.  Keep a record of your symptoms, peak flow readings (if you use a peak flow meter), and any use of your rescue inhaler to discuss with your primary care physician.  Monitoring Your Condition:    Monitor your symptoms. If you notice an increase in frequency or severity of symptoms such as wheezing, shortness of breath, chest tightness, or coughing, contact your healthcare provider.  Know the signs of an asthma attack and understand when to seek emergency medical help.  Lifestyle and Home Environment:    Avoid asthma triggers such as smoke, pollution, dust, and pet dander as much as possible.  Ensure your living environment is clean and free of allergens. Regularly change air filters, and consider using an air purifier.  Emergency Plan:    Keep your emergency contact numbers and your rescue medications accessible at all times.  If you experience severe symptoms or an asthma attack that is not relieved by your rescue inhaler, seek emergency medical attention immediately.  Questions or Concerns:    If you have any questions or concerns about your condition, medications, or treatment plan, do not hesitate to contact your healthcare provider.  Please remember that managing asthma is a team effort between you and your healthcare providers. Adherence to your treatment plan and awareness of your symptoms are key to effectively managing your asthma.